# Patient Record
Sex: FEMALE | Race: AMERICAN INDIAN OR ALASKA NATIVE | NOT HISPANIC OR LATINO
[De-identification: names, ages, dates, MRNs, and addresses within clinical notes are randomized per-mention and may not be internally consistent; named-entity substitution may affect disease eponyms.]

---

## 2017-04-08 ENCOUNTER — APPOINTMENT (OUTPATIENT)
Dept: MRI IMAGING | Facility: CLINIC | Age: 77
End: 2017-04-08

## 2017-04-19 ENCOUNTER — OUTPATIENT (OUTPATIENT)
Dept: OUTPATIENT SERVICES | Facility: HOSPITAL | Age: 77
LOS: 1 days | End: 2017-04-19
Payer: MEDICARE

## 2017-04-19 ENCOUNTER — APPOINTMENT (OUTPATIENT)
Dept: MRI IMAGING | Facility: CLINIC | Age: 77
End: 2017-04-19

## 2017-04-19 DIAGNOSIS — Z00.8 ENCOUNTER FOR OTHER GENERAL EXAMINATION: ICD-10-CM

## 2017-04-19 PROCEDURE — 72148 MRI LUMBAR SPINE W/O DYE: CPT

## 2017-04-21 ENCOUNTER — APPOINTMENT (OUTPATIENT)
Dept: MRI IMAGING | Facility: CLINIC | Age: 77
End: 2017-04-21

## 2017-04-29 ENCOUNTER — APPOINTMENT (OUTPATIENT)
Dept: MRI IMAGING | Facility: CLINIC | Age: 77
End: 2017-04-29

## 2017-10-12 ENCOUNTER — OTHER (OUTPATIENT)
Age: 77
End: 2017-10-12

## 2017-10-12 ENCOUNTER — APPOINTMENT (OUTPATIENT)
Dept: CARDIOLOGY | Facility: CLINIC | Age: 77
End: 2017-10-12
Payer: MEDICARE

## 2017-10-12 ENCOUNTER — NON-APPOINTMENT (OUTPATIENT)
Age: 77
End: 2017-10-12

## 2017-10-12 VITALS — SYSTOLIC BLOOD PRESSURE: 149 MMHG | HEART RATE: 79 BPM | OXYGEN SATURATION: 96 % | DIASTOLIC BLOOD PRESSURE: 81 MMHG

## 2017-10-12 DIAGNOSIS — M79.604 PAIN IN RIGHT LEG: ICD-10-CM

## 2017-10-12 DIAGNOSIS — R06.09 OTHER FORMS OF DYSPNEA: ICD-10-CM

## 2017-10-12 DIAGNOSIS — M79.605 PAIN IN RIGHT LEG: ICD-10-CM

## 2017-10-12 PROBLEM — Z00.00 ENCOUNTER FOR PREVENTIVE HEALTH EXAMINATION: Noted: 2017-10-12

## 2017-10-12 PROCEDURE — 93000 ELECTROCARDIOGRAM COMPLETE: CPT

## 2017-10-12 PROCEDURE — 99204 OFFICE O/P NEW MOD 45 MIN: CPT

## 2017-10-12 RX ORDER — ASPIRIN ENTERIC COATED TABLETS 81 MG 81 MG/1
81 TABLET, DELAYED RELEASE ORAL
Qty: 30 | Refills: 6 | Status: ACTIVE | COMMUNITY
Start: 2017-10-12

## 2017-10-12 RX ORDER — LOSARTAN POTASSIUM 100 MG/1
100 TABLET, FILM COATED ORAL
Qty: 90 | Refills: 3 | Status: ACTIVE | COMMUNITY
Start: 2017-10-12

## 2017-10-23 ENCOUNTER — OUTPATIENT (OUTPATIENT)
Dept: OUTPATIENT SERVICES | Facility: HOSPITAL | Age: 77
LOS: 1 days | End: 2017-10-23
Payer: MEDICARE

## 2017-10-23 ENCOUNTER — APPOINTMENT (OUTPATIENT)
Dept: CV DIAGNOSTICS | Facility: HOSPITAL | Age: 77
End: 2017-10-23

## 2017-10-23 ENCOUNTER — APPOINTMENT (OUTPATIENT)
Dept: CV DIAGNOSITCS | Facility: HOSPITAL | Age: 77
End: 2017-10-23

## 2017-10-23 DIAGNOSIS — R06.09 OTHER FORMS OF DYSPNEA: ICD-10-CM

## 2017-10-23 PROCEDURE — 93306 TTE W/DOPPLER COMPLETE: CPT | Mod: 26

## 2017-10-23 PROCEDURE — 93018 CV STRESS TEST I&R ONLY: CPT

## 2017-10-23 PROCEDURE — 93306 TTE W/DOPPLER COMPLETE: CPT

## 2017-10-23 PROCEDURE — 93017 CV STRESS TEST TRACING ONLY: CPT

## 2017-10-23 PROCEDURE — 78452 HT MUSCLE IMAGE SPECT MULT: CPT | Mod: 26

## 2017-10-23 PROCEDURE — A9500: CPT

## 2017-10-23 PROCEDURE — 78452 HT MUSCLE IMAGE SPECT MULT: CPT

## 2017-10-23 PROCEDURE — 93016 CV STRESS TEST SUPVJ ONLY: CPT

## 2019-10-01 ENCOUNTER — EMERGENCY (EMERGENCY)
Facility: HOSPITAL | Age: 79
LOS: 1 days | Discharge: HOME | End: 2019-10-01
Attending: EMERGENCY MEDICINE | Admitting: EMERGENCY MEDICINE
Payer: MEDICARE

## 2019-10-01 VITALS
OXYGEN SATURATION: 99 % | DIASTOLIC BLOOD PRESSURE: 76 MMHG | HEIGHT: 62 IN | TEMPERATURE: 98 F | RESPIRATION RATE: 16 BRPM | SYSTOLIC BLOOD PRESSURE: 163 MMHG | HEART RATE: 60 BPM | WEIGHT: 184.97 LBS

## 2019-10-01 VITALS
OXYGEN SATURATION: 99 % | RESPIRATION RATE: 15 BRPM | HEART RATE: 61 BPM | DIASTOLIC BLOOD PRESSURE: 77 MMHG | TEMPERATURE: 98 F | SYSTOLIC BLOOD PRESSURE: 152 MMHG

## 2019-10-01 LAB
APTT BLD: 31.4 SEC — SIGNIFICANT CHANGE UP (ref 28.5–37)
INR BLD: 1 RATIO — SIGNIFICANT CHANGE UP (ref 0.88–1.16)
PROTHROM AB SERPL-ACNC: 10.9 SEC — SIGNIFICANT CHANGE UP (ref 10–12.9)

## 2019-10-01 PROCEDURE — 72125 CT NECK SPINE W/O DYE: CPT | Mod: 26

## 2019-10-01 PROCEDURE — 96365 THER/PROPH/DIAG IV INF INIT: CPT

## 2019-10-01 PROCEDURE — 72125 CT NECK SPINE W/O DYE: CPT

## 2019-10-01 PROCEDURE — 85730 THROMBOPLASTIN TIME PARTIAL: CPT

## 2019-10-01 PROCEDURE — 36415 COLL VENOUS BLD VENIPUNCTURE: CPT

## 2019-10-01 PROCEDURE — 85610 PROTHROMBIN TIME: CPT

## 2019-10-01 PROCEDURE — 96375 TX/PRO/DX INJ NEW DRUG ADDON: CPT

## 2019-10-01 PROCEDURE — 99284 EMERGENCY DEPT VISIT MOD MDM: CPT

## 2019-10-01 PROCEDURE — 99284 EMERGENCY DEPT VISIT MOD MDM: CPT | Mod: 25

## 2019-10-01 RX ORDER — MAGNESIUM SULFATE 500 MG/ML
2 VIAL (ML) INJECTION ONCE
Refills: 0 | Status: COMPLETED | OUTPATIENT
Start: 2019-10-01 | End: 2019-10-01

## 2019-10-01 RX ORDER — CYCLOBENZAPRINE HYDROCHLORIDE 10 MG/1
1 TABLET, FILM COATED ORAL
Qty: 30 | Refills: 0
Start: 2019-10-01

## 2019-10-01 RX ORDER — SODIUM CHLORIDE 9 MG/ML
1000 INJECTION INTRAMUSCULAR; INTRAVENOUS; SUBCUTANEOUS ONCE
Refills: 0 | Status: COMPLETED | OUTPATIENT
Start: 2019-10-01 | End: 2019-10-01

## 2019-10-01 RX ORDER — KETOROLAC TROMETHAMINE 30 MG/ML
15 SYRINGE (ML) INJECTION ONCE
Refills: 0 | Status: DISCONTINUED | OUTPATIENT
Start: 2019-10-01 | End: 2019-10-01

## 2019-10-01 RX ORDER — ASPIRIN/CALCIUM CARB/MAGNESIUM 324 MG
0 TABLET ORAL
Qty: 0 | Refills: 0 | DISCHARGE

## 2019-10-01 RX ORDER — METOCLOPRAMIDE HCL 10 MG
10 TABLET ORAL ONCE
Refills: 0 | Status: COMPLETED | OUTPATIENT
Start: 2019-10-01 | End: 2019-10-01

## 2019-10-01 RX ORDER — METHOCARBAMOL 500 MG/1
1000 TABLET, FILM COATED ORAL ONCE
Refills: 0 | Status: COMPLETED | OUTPATIENT
Start: 2019-10-01 | End: 2019-10-01

## 2019-10-01 RX ORDER — METHOCARBAMOL 500 MG/1
1000 TABLET, FILM COATED ORAL ONCE
Refills: 0 | Status: DISCONTINUED | OUTPATIENT
Start: 2019-10-01 | End: 2019-10-01

## 2019-10-01 RX ADMIN — Medication 15 MILLIGRAM(S): at 16:47

## 2019-10-01 RX ADMIN — Medication 125 MILLIGRAM(S): at 15:10

## 2019-10-01 RX ADMIN — Medication 2 GRAM(S): at 16:28

## 2019-10-01 RX ADMIN — METHOCARBAMOL 1000 MILLIGRAM(S): 500 TABLET, FILM COATED ORAL at 15:47

## 2019-10-01 RX ADMIN — Medication 10 MILLIGRAM(S): at 15:12

## 2019-10-01 RX ADMIN — Medication 150 MILLIGRAM(S): at 17:29

## 2019-10-01 RX ADMIN — SODIUM CHLORIDE 1000 MILLILITER(S): 9 INJECTION INTRAMUSCULAR; INTRAVENOUS; SUBCUTANEOUS at 15:00

## 2019-10-01 RX ADMIN — Medication 50 GRAM(S): at 15:25

## 2019-10-01 RX ADMIN — Medication 15 MILLIGRAM(S): at 17:15

## 2019-10-01 NOTE — ED PROVIDER NOTE - CARE PLAN
Principal Discharge DX:	Intractable migraine Principal Discharge DX:	Intractable migraine  Secondary Diagnosis:	Cervical spondylosis

## 2019-10-01 NOTE — ED PROVIDER NOTE - CLINICAL SUMMARY MEDICAL DECISION MAKING FREE TEXT BOX
78 yo F with intractable migraine, currently under neurological care, no alarming signs, chronic HA-->will check labs, analgesia, d/c for neuro evaluation outpt

## 2019-10-01 NOTE — ED PROVIDER NOTE - PROGRESS NOTE DETAILS
Allison ELENA for ED attending, Dr. Mitchell : 78 y/o female with a PMHx of migraines presents to the ED c/o HA x 4-5 weeks. Pt can usually manage her migraines but this episode is constant and medications give no relief. Was seen in another ER, had CT and was dx with sinusitis and put on abx and steroids, but still no relief of HA after course. Has seen neuro, had MRI/MRA wnl, neuro suggested spinal tap and to get PT/PTT to r/o SAH. Because pain was too back was told to go the ED. Also reports neck pain, and HA is frontal. Allison ELENA for ED attending, Dr. Mitchell: reassessed pt , HA is worse after some meds, which has happened in past after certain meds, no meningeal signs, no focal deficits, reviewed CT results with pt and son, discussed with pt's neurologist Dr. Alcala,  suspicion for subarachnoid is very low given 4 week time frame, and pattern of pain, did offer tap to pt and given the risk and benefits pt declined. will attempt additional meds, suspicions for cervical HA with chronic migraine, after pain is better controlled, tx and released for outpt cervical MRI. Pt reports symptomatic relief. Will d/c on Lyrica, along with Medrol dose pack and Flexeril as per son's request. Pt well appearing, NVI. No emergent concerns at this time. Pt to f/u with neurology. Stable for DC home.

## 2019-10-01 NOTE — ED PROVIDER NOTE - NSTIMEPROVIDERCAREINITIATE_GEN_ER
- Pt malnourished with poor PO intake  - SHANE tube placed 12/8/17 and TF started  - Not tolerating TF --> central line placed and TPN started 12/9  - Remove shane 12/11   01-Oct-2019 14:05

## 2019-10-01 NOTE — ED ADULT NURSE REASSESSMENT NOTE - NS ED NURSE REASSESS COMMENT FT1
Patient to CT at this time. Ambulated to bathroom. Patient smiling, requesting to eat dinner. MD request to have all test completed prior to eating.

## 2019-10-01 NOTE — ED PROVIDER NOTE - NSFOLLOWUPINSTRUCTIONS_ED_ALL_ED_FT
Take medication as directed. Follow up with neurology within the next 2-3 days. Return to ED immediately for new or worsening symptoms.

## 2019-10-01 NOTE — ED PROVIDER NOTE - PATIENT PORTAL LINK FT
You can access the FollowMyHealth Patient Portal offered by [facility] by registering at the following website: [email or web address]. By joining [employer]s FollowMyHealth portal, you will also be able to view your health information using other applications (apps) compatible with our system.

## 2019-10-01 NOTE — ED ADULT NURSE NOTE - ISOLATION TYPE:
None
Keep dressing dry, clean, intact for 2 days. Do not get incision wet for 48 hours (2 days). After 2 days, remove dressing (clear tape and gauze) and leave steri strips (white strips) on. You can shower with steri strips on. The steri strips will fall off during shower. Do not rub the steri strips off. Pat dry after shower. It sometimes take 2-3 days for them to fall off.

## 2019-10-01 NOTE — ED ADULT NURSE NOTE - CHPI ED NUR SYMPTOMS NEG
no confusion/no loss of consciousness/no nausea/no numbness/no vomiting/no weakness/no blurred vision/no dizziness/no change in level of consciousness/no fever

## 2019-10-01 NOTE — ED PROVIDER NOTE - OBJECTIVE STATEMENT
80 yo F PMHx HTN, HLD, hypothyroidism GERD, migraines presents to ED c/o intractable migraine at at least 4 weeks. Pt presents with her son. As per son, pt had initial CT scan after symptom onset which showed pansinusitis--> saw ENT, was treated with abx/steroids x 2 rounds, no symptom relief. Pt has since followed up with neurology (Dr. Dorian España/Yajaira 78 yo F PMHx HTN, HLD, hypothyroidism GERD, migraines presents to ED c/o intractable migraine at at least 4 weeks. Pt presents with her son. As per son, pt had initial CT scan after symptom onset which showed pansinusitis--> saw ENT, was treated with abx/steroids x 2 rounds, no symptom relief. Pt has since followed up with neurology (Dr. Dorian España/Yajaira Alcala). Pt has had f/u CT scan and MRI/MRA (2 days ago)-- all imaging, blood work (including ESR as per son) negative. Plan was for pt to have spinal tap in the office. Neurologist requested PT/PTT prior to tap, but pt couldn't go to the lab due to pain 80 yo F PMHx HTN, HLD, hypothyroidism GERD, migraines presents to ED c/o intractable migraine at at least 4 weeks. Reports constant pressure-like, aching pain to b/l frontal area and occiput. Pt presents with her son. As per son, pt had initial CT scan after symptom onset which showed pansinusitis--> saw ENT, was treated with abx/steroids x 2 rounds, no symptom relief. Pt has since followed up with neurology (Dr. Dorian España/Yajaira Alcala). Pt has had f/u CT scan and MRI/MRA (2 days ago)-- all imaging, blood work (including ESR as per son) negative. Throughout this time, pt has been giving various treatments for migraine with no relief. Plan was for pt to have spinal tap in the office. As per son, neurologist requested PT/PTT prior to tap, but pt couldn't go to the lab due to pain- so they came to the ED. Pt denies numbness/tingling, fever/chills

## 2019-10-01 NOTE — ED ADULT NURSE NOTE - NSIMPLEMENTINTERV_GEN_ALL_ED
Implemented All Universal Safety Interventions:  [place] to call system. Call bell, personal items and telephone within reach. Instruct patient to call for assistance. Room bathroom lighting operational. Non-slip footwear when patient is off stretcher. Physically safe environment: no spills, clutter or unnecessary equipment. Stretcher in lowest position, wheels locked, appropriate side rails in place.

## 2019-10-10 PROBLEM — I10 ESSENTIAL (PRIMARY) HYPERTENSION: Chronic | Status: ACTIVE | Noted: 2019-10-01

## 2019-10-10 PROBLEM — G43.909 MIGRAINE, UNSPECIFIED, NOT INTRACTABLE, WITHOUT STATUS MIGRAINOSUS: Chronic | Status: ACTIVE | Noted: 2019-10-01

## 2019-10-10 PROBLEM — K21.9 GASTRO-ESOPHAGEAL REFLUX DISEASE WITHOUT ESOPHAGITIS: Chronic | Status: ACTIVE | Noted: 2019-10-01

## 2019-10-10 PROBLEM — E03.9 HYPOTHYROIDISM, UNSPECIFIED: Chronic | Status: ACTIVE | Noted: 2019-10-01

## 2019-10-11 ENCOUNTER — APPOINTMENT (OUTPATIENT)
Dept: PAIN MANAGEMENT | Facility: CLINIC | Age: 79
End: 2019-10-11
Payer: MEDICARE

## 2019-10-11 VITALS
BODY MASS INDEX: 33.49 KG/M2 | SYSTOLIC BLOOD PRESSURE: 143 MMHG | WEIGHT: 182 LBS | HEART RATE: 58 BPM | DIASTOLIC BLOOD PRESSURE: 73 MMHG | HEIGHT: 62 IN

## 2019-10-11 PROCEDURE — 99204 OFFICE O/P NEW MOD 45 MIN: CPT

## 2019-10-11 RX ORDER — BACLOFEN 10 MG/1
10 TABLET ORAL EVERY 8 HOURS
Qty: 60 | Refills: 3 | Status: ACTIVE | COMMUNITY
Start: 2019-10-11 | End: 1900-01-01

## 2019-10-14 ENCOUNTER — TRANSCRIPTION ENCOUNTER (OUTPATIENT)
Age: 79
End: 2019-10-14

## 2019-10-15 NOTE — PHYSICAL EXAM
[General Appearance - Alert] : alert [General Appearance - In No Acute Distress] : in no acute distress [General Appearance - Well Nourished] : well nourished [General Appearance - Well-Appearing] : healthy appearing [Oriented To Time, Place, And Person] : oriented to person, place, and time [FreeTextEntry1] : obese [Impaired Insight] : insight and judgment were intact [Affect] : the affect was normal [Mood] : the mood was normal [Memory Remote] : remote memory was not impaired [Memory Recent] : recent memory was not impaired [Person] : oriented to person [Place] : oriented to place [Remote Intact] : remote memory intact [Time] : oriented to time [Concentration Intact] : normal concentrating ability [Registration Intact] : recent registration memory intact [Visual Intact] : visual attention was ~T not ~L decreased [Span Intact] : the attention span was normal [Naming Objects] : no difficulty naming common objects [Writing A Sentence] : no difficulty writing a sentence [Reading] : reading intact [Current Events] : adequate knowledge of current events [Comprehension] : comprehension intact [Fluency] : fluency intact [Vocabulary] : adequate range of vocabulary [Cranial Nerves Oculomotor (III)] : extraocular motion intact [Past History] : adequate knowledge of personal past history [Cranial Nerves Facial (VII)] : face symmetrical [Cranial Nerves Trigeminal (V)] : facial sensation intact symmetrically [Cranial Nerves Accessory (XI - Cranial And Spinal)] : head turning and shoulder shrug symmetric [Cranial Nerves Hypoglossal (XII)] : there was no tongue deviation with protrusion [Cranial Nerves Vestibulocochlear (VIII)] : hearing was intact bilaterally [Motor Strength] : muscle strength was normal in all four extremities [No Muscle Atrophy] : normal bulk in all four extremities [Motor Handedness Right-Handed] : the patient is right hand dominant [Motor Strength Lower Extremities Bilaterally] : strength was normal in both lower extremities [Motor Strength Upper Extremities Bilaterally] : strength was normal in both upper extremities [Allodynia] : no ~T allodynia present [Balance] : balance was intact [Sensation Tactile Decrease] : light touch was intact [Tremor] : no tremor present [Limited Balance] : balance was intact [Past-pointing] : there was no past-pointing [Dysdiadochokinesia Bilaterally] : not present [Coordination - Dysmetria Impaired Finger-to-Nose Bilateral] : not present [2+] : Brachioradialis left 2+ [Sclera] : the sclera and conjunctiva were normal [FreeTextEntry9] : negative saini's [Extraocular Movements] : extraocular movements were intact [PERRL With Normal Accommodation] : pupils were equal in size, round, reactive to light, with normal accommodation [Outer Ear] : the ears and nose were normal in appearance [Neck Cervical Mass (___cm)] : no neck mass was observed [Edema] : there was no peripheral edema [Exaggerated Use Of Accessory Muscles For Inspiration] : no accessory muscle use [Abnormal Walk] : normal gait [No Spinal Tenderness] : no spinal tenderness [Involuntary Movements] : no involuntary movements were seen [Nail Clubbing] : no clubbing  or cyanosis of the fingernails [Musculoskeletal - Swelling] : no joint swelling seen [Motor Tone] : muscle strength and tone were normal [Skin Color & Pigmentation] : normal skin color and pigmentation [] : no rash [Skin Turgor] : normal skin turgor

## 2019-10-15 NOTE — HISTORY OF PRESENT ILLNESS
[Chronic Headache] : chronic headache [Photophobia] : photophobia [Phonophobia] : phonophobia [Neck Pain] : neck pain [Scalp Tenderness] : scalp tenderness [FreeTextEntry1] : Pt is a 80 yo woman who notes a history of headache that goes back to after her 2 children were born (and likely longer).  Notes that they were intermittent originally but have varied in frequency over time. Has had at least 2 periods of exacerbation in 2013 and 2015 where her function was severely compromised. More recently had a more severe, refractory episode which lasted multiple days and was refractory to her treatment.  Historically, she will usually treat with butalbital but can use it daily ( notes 2-3 years of daily use at some point.(unclear)  Does get p/p phobia, no nausea, \par Headaches can be worst upon awakening in the morning.  Has been chronic and daily for at least 1 month.\par Has used codeine in past but was "off", Had used topiramate but had se's that she can't currently recall, had used amitriptyline but also felt too sedated with its use to increase dose.  Is on losartan now (had been on two ace inhibitors prior to this.)\par Did have steroid taper during ER visit with most recent exacerbation with moderate effect and no noted ill side effects.\par  [Vomiting] : no Vomiting [Nausea] : no nausea

## 2019-10-15 NOTE — REVIEW OF SYSTEMS
[Chills] : no chills [Fever] : no fever [Feeling Poorly] : feeling poorly [Feeling Tired] : feeling tired [Recent Weight Loss (___ Lbs)] : no recent weight loss [Recent Weight Gain (___ Lbs)] : no recent weight gain [Eye Pain] : eye pain [Eyesight Problems] : no eyesight problems [Chest Pain] : no chest pain [Loss Of Hearing] : no hearing loss [Constipation] : no constipation [Cough] : no cough [Palpitations] : no palpitations [Neck Pain] : neck pain [Arthralgias] : arthralgias [Diarrhea] : no diarrhea [Skin Lesions] : no skin lesions [Joint Stiffness] : joint stiffness [Skin Wound] : no skin wound [Sleep Disturbances] : sleep disturbances [Dizziness] : dizziness [Itching] : no itching [Muscle Weakness] : no muscle weakness [Swollen Glands] : no swollen glands [Swollen Glands In The Neck] : no swollen glands in the neck

## 2019-10-15 NOTE — ASSESSMENT
[FreeTextEntry1] : Gave information re: general headache hygiene and headache triggers\par will give trial of prn baclofen\par trial of botulinum toxin.\par information re: acute management.

## 2019-10-18 ENCOUNTER — APPOINTMENT (OUTPATIENT)
Dept: PAIN MANAGEMENT | Facility: CLINIC | Age: 79
End: 2019-10-18
Payer: MEDICARE

## 2019-10-18 VITALS
WEIGHT: 182 LBS | HEART RATE: 61 BPM | HEIGHT: 62 IN | DIASTOLIC BLOOD PRESSURE: 65 MMHG | BODY MASS INDEX: 33.49 KG/M2 | SYSTOLIC BLOOD PRESSURE: 120 MMHG

## 2019-10-18 PROCEDURE — 64615 CHEMODENERV MUSC MIGRAINE: CPT

## 2019-10-18 PROCEDURE — 99213 OFFICE O/P EST LOW 20 MIN: CPT | Mod: 25

## 2019-10-22 NOTE — REVIEW OF SYSTEMS
[Chills] : no chills [Fever] : no fever [Feeling Tired] : feeling tired [Feeling Poorly] : feeling poorly [Eye Pain] : eye pain [Eyesight Problems] : no eyesight problems [Loss Of Hearing] : no hearing loss [Chest Pain] : no chest pain [Palpitations] : no palpitations [Cough] : no cough [Arthralgias] : arthralgias [Skin Lesions] : no skin lesions [Neck Pain] : neck pain [Itching] : no itching [Skin Wound] : no skin wound [Sleep Disturbances] : sleep disturbances [Muscle Weakness] : no muscle weakness [As Noted in HPI] : as noted in HPI

## 2019-10-22 NOTE — PHYSICAL EXAM
[General Appearance - Alert] : alert [General Appearance - In No Acute Distress] : in no acute distress [General Appearance - Well Nourished] : well nourished [General Appearance - Well-Appearing] : healthy appearing [Impaired Insight] : insight and judgment were intact [Oriented To Time, Place, And Person] : oriented to person, place, and time [Mood] : the mood was normal [Memory Recent] : recent memory was not impaired [Affect] : the affect was normal [Memory Remote] : remote memory was not impaired [Person] : oriented to person [Place] : oriented to place [Time] : oriented to time [Registration Intact] : recent registration memory intact [Remote Intact] : remote memory intact [Span Intact] : the attention span was normal [Concentration Intact] : normal concentrating ability [Visual Intact] : visual attention was ~T not ~L decreased [Writing A Sentence] : no difficulty writing a sentence [Fluency] : fluency intact [Naming Objects] : no difficulty naming common objects [Comprehension] : comprehension intact [Reading] : reading intact [Current Events] : adequate knowledge of current events [Past History] : adequate knowledge of personal past history [Vocabulary] : adequate range of vocabulary [Cranial Nerves Facial (VII)] : face symmetrical [Cranial Nerves Oculomotor (III)] : extraocular motion intact [Cranial Nerves Trigeminal (V)] : facial sensation intact symmetrically [Cranial Nerves Accessory (XI - Cranial And Spinal)] : head turning and shoulder shrug symmetric [Cranial Nerves Vestibulocochlear (VIII)] : hearing was intact bilaterally [Cranial Nerves Hypoglossal (XII)] : there was no tongue deviation with protrusion [Motor Strength] : muscle strength was normal in all four extremities [No Muscle Atrophy] : normal bulk in all four extremities [Motor Handedness Right-Handed] : the patient is right hand dominant [Sensation Tactile Decrease] : light touch was intact [Balance] : balance was intact [Sclera] : the sclera and conjunctiva were normal [Outer Ear] : the ears and nose were normal in appearance [Extraocular Movements] : extraocular movements were intact [PERRL With Normal Accommodation] : pupils were equal in size, round, reactive to light, with normal accommodation [Neck Cervical Mass (___cm)] : no neck mass was observed [Exaggerated Use Of Accessory Muscles For Inspiration] : no accessory muscle use [Edema] : there was no peripheral edema [Abnormal Walk] : normal gait [No Spinal Tenderness] : no spinal tenderness [Nail Clubbing] : no clubbing  or cyanosis of the fingernails [Involuntary Movements] : no involuntary movements were seen [Musculoskeletal - Swelling] : no joint swelling seen [Motor Tone] : muscle strength and tone were normal [Skin Color & Pigmentation] : normal skin color and pigmentation [Skin Turgor] : normal skin turgor [] : no rash [FreeTextEntry1] : obese [Motor Strength Lower Extremities Bilaterally] : strength was normal in both lower extremities [Motor Strength Upper Extremities Bilaterally] : strength was normal in both upper extremities [Limited Balance] : balance was intact [Past-pointing] : there was no past-pointing [Allodynia] : no ~T allodynia present [Tremor] : no tremor present [Dysdiadochokinesia Bilaterally] : not present [Coordination - Dysmetria Impaired Finger-to-Nose Bilateral] : not present [2+] : Patella left 2+ [FreeTextEntry9] : negative saini's

## 2019-10-22 NOTE — HISTORY OF PRESENT ILLNESS
[FreeTextEntry1] : Pt and  return today for administration of Botox.  She notes concern over its use and potential pain associated.  However, she notes tat she continues to have similar pain to previous with no further relief.  Is anticipating trial today prior to trip back to [facility] for the winter.\par No change in quality of headache\par No new associated symptoms.\par No signs of infection- no fevers, chills or rash.\par Understands risks and benefits of treatment. [Phonophobia] : phonophobia [Chronic Headache] : chronic headache [Photophobia] : photophobia [Neck Pain] : neck pain [> 4 hours] : > 4 hours [Daily] : daily [stayed the same] : stayed the same [Stable] : The patient reports ~his/her~ symptoms since the last visit are stable

## 2019-10-22 NOTE — PROCEDURE
[Chronic migraine] : Chronic migraine [Consent Signed] : consent signed [Continue Current Treatment] : continue current treatment [FreeTextEntry1] : 200 units of botox in 4 cc normal saline\par .2 in procerus\par .1 in left and right \par .1 in 2 left,1 midline and 2 right frontalis\par .1 in 4 left and 4 right temporalis\par .1 in 3 left and 3 right occipitalis\par .1 in 2 left and 2 right paraspinals\par .1 in 3 left and 3 right trapezius [Adverse Effects] : no adverse effects

## 2020-03-17 ENCOUNTER — TRANSCRIPTION ENCOUNTER (OUTPATIENT)
Age: 80
End: 2020-03-17

## 2020-03-17 ENCOUNTER — APPOINTMENT (OUTPATIENT)
Dept: PAIN MANAGEMENT | Facility: CLINIC | Age: 80
End: 2020-03-17

## 2020-04-23 ENCOUNTER — APPOINTMENT (OUTPATIENT)
Dept: PAIN MANAGEMENT | Facility: CLINIC | Age: 80
End: 2020-04-23

## 2020-07-28 ENCOUNTER — TRANSCRIPTION ENCOUNTER (OUTPATIENT)
Age: 80
End: 2020-07-28

## 2020-08-10 ENCOUNTER — APPOINTMENT (OUTPATIENT)
Dept: PAIN MANAGEMENT | Facility: CLINIC | Age: 80
End: 2020-08-10
Payer: MEDICARE

## 2020-08-10 VITALS
SYSTOLIC BLOOD PRESSURE: 143 MMHG | DIASTOLIC BLOOD PRESSURE: 76 MMHG | HEART RATE: 75 BPM | HEIGHT: 62 IN | WEIGHT: 170 LBS | BODY MASS INDEX: 31.28 KG/M2

## 2020-08-10 VITALS — TEMPERATURE: 97.7 F

## 2020-08-10 DIAGNOSIS — R53.83 OTHER FATIGUE: ICD-10-CM

## 2020-08-10 PROCEDURE — 64615 CHEMODENERV MUSC MIGRAINE: CPT

## 2020-08-10 PROCEDURE — 99214 OFFICE O/P EST MOD 30 MIN: CPT | Mod: 25

## 2020-08-14 PROBLEM — R53.83 FATIGUE: Status: ACTIVE | Noted: 2017-10-12

## 2020-08-14 NOTE — ASSESSMENT
[FreeTextEntry1] : Would plan to repeat Botox today\par continue other medication and consider use of cgrp antagonist or ditan in future.\par to rtc 4 months.

## 2020-08-14 NOTE — REVIEW OF SYSTEMS
[Feeling Poorly] : feeling poorly [Fever] : no fever [Recent Weight Gain (___ Lbs)] : no recent weight gain [Feeling Tired] : feeling tired [Eye Pain] : eye pain [Nasal Discharge] : no nasal discharge [Recent Weight Loss (___ Lbs)] : no recent weight loss [Chest Pain] : no chest pain [Shortness Of Breath] : no shortness of breath [Palpitations] : no palpitations [Cough] : no cough [SOB on Exertion] : shortness of breath during exertion [Skin Lesions] : no skin lesions [Skin Wound] : no skin wound [Arthralgias] : arthralgias [Itching] : no itching [Confused] : no confusion [Convulsions] : no convulsions [Dizziness] : dizziness [Fainting] : no fainting [Swollen Glands] : no swollen glands [Muscle Weakness] : no muscle weakness [Swollen Glands In The Neck] : no swollen glands in the neck

## 2020-08-14 NOTE — HISTORY OF PRESENT ILLNESS
[FreeTextEntry1] : Pt and family member note that she did very well after last injections- but that had been in Oct of last year.  Had benefit ? 6 months with wearing off.  No noted ill side effects from treatment at that time.  No other change in the quality of events that she has had.  No new focal neurological deficits.  No COVID infection.  \par Is anticipating injections to be done today.\par Was hoping to return to [place] this fall, but family notes that it is unlikely to occur because of pandemic. [Headache] : headache [Chronic Headache] : chronic headache [Dizziness] : dizziness [Photophobia] : photophobia [Nausea] : nausea [Neck Pain] : neck pain [Scalp Tenderness] : scalp tenderness [Phonophobia] : phonophobia [> 4 hours] : > 4 hours [> 15 days per month] : > 15 days per month [Stable] : Overall, patient's activity level is stable [Worsened] : The patient reports ~his/her~ symptoms since the last visit have worsened

## 2020-08-14 NOTE — PHYSICAL EXAM
[General Appearance - Alert] : alert [General Appearance - In No Acute Distress] : in no acute distress [General Appearance - Well Nourished] : well nourished [General Appearance - Well-Appearing] : healthy appearing [Place] : oriented to place [Person] : oriented to person [Time] : oriented to time [Remote Intact] : remote memory intact [Registration Intact] : recent registration memory intact [Visual Intact] : visual attention was ~T not ~L decreased [Concentration Intact] : normal concentrating ability [Comprehension] : comprehension intact [Fluency] : fluency intact [Past History] : adequate knowledge of personal past history [Vocabulary] : adequate range of vocabulary [Current Events] : adequate knowledge of current events [Cranial Nerves Facial (VII)] : face symmetrical [Cranial Nerves Oculomotor (III)] : extraocular motion intact [Cranial Nerves Vestibulocochlear (VIII)] : hearing was intact bilaterally [Cranial Nerves Hypoglossal (XII)] : there was no tongue deviation with protrusion [Motor Tone] : muscle tone was normal in all four extremities [Cranial Nerves Accessory (XI - Cranial And Spinal)] : head turning and shoulder shrug symmetric [No Muscle Atrophy] : normal bulk in all four extremities [Motor Handedness Right-Handed] : the patient is right hand dominant [Sensation Tactile Decrease] : light touch was intact [2+] : Patella left 2+ [Neck Cervical Mass (___cm)] : no neck mass was observed [Involuntary Movements] : no involuntary movements were seen [Nail Clubbing] : no clubbing  or cyanosis of the fingernails [Skin Color & Pigmentation] : normal skin color and pigmentation [Skin Turgor] : normal skin turgor [FreeTextEntry1] : obese [Motor Strength Upper Extremities Bilaterally] : strength was normal in both upper extremities [Motor Strength Lower Extremities Bilaterally] : strength was normal in both lower extremities [Allodynia] : no ~T allodynia present [Tremor] : no tremor present [Dysdiadochokinesia Bilaterally] : not present [FreeTextEntry9] : negative saini's [Sclera] : the sclera and conjunctiva were normal [No JOHAN] : no internuclear ophthalmoplegia [Strabismus] : no strabismus was seen [Outer Ear] : the ears and nose were normal in appearance [] : no respiratory distress [Exaggerated Use Of Accessory Muscles For Inspiration] : no accessory muscle use

## 2020-08-14 NOTE — PROCEDURE
[Chronic migraine] : Chronic migraine [FreeTextEntry1] : 200 units of botox in 4 cc normal saline\par .2 in procerus\par .1 in left and right \par .1 in 2 left,1 midline and 2 right frontalis\par .1 in 4 left and 4 right temporalis\par .1 in 3 left and 3 right occipitalis\par .1 in 2 left and 2 right paraspinals\par .1 in 3 left and 3 right trapezius [Consent Signed] : consent signed [Adverse Effects] : no adverse effects [Continue Current Treatment] : continue current treatment

## 2020-09-14 ENCOUNTER — TRANSCRIPTION ENCOUNTER (OUTPATIENT)
Age: 80
End: 2020-09-14

## 2020-09-24 ENCOUNTER — TRANSCRIPTION ENCOUNTER (OUTPATIENT)
Age: 80
End: 2020-09-24

## 2020-09-24 RX ORDER — METHYLPREDNISOLONE 4 MG/1
4 TABLET ORAL
Qty: 1 | Refills: 0 | Status: ACTIVE | COMMUNITY
Start: 2020-09-24 | End: 1900-01-01

## 2020-10-01 ENCOUNTER — TRANSCRIPTION ENCOUNTER (OUTPATIENT)
Age: 80
End: 2020-10-01

## 2020-10-05 ENCOUNTER — TRANSCRIPTION ENCOUNTER (OUTPATIENT)
Age: 80
End: 2020-10-05

## 2020-10-05 RX ORDER — KETOROLAC TROMETHAMINE 10 MG/1
10 TABLET, FILM COATED ORAL
Qty: 12 | Refills: 0 | Status: ACTIVE | COMMUNITY
Start: 2020-10-05 | End: 1900-01-01

## 2020-10-08 ENCOUNTER — TRANSCRIPTION ENCOUNTER (OUTPATIENT)
Age: 80
End: 2020-10-08

## 2020-10-09 ENCOUNTER — RX RENEWAL (OUTPATIENT)
Age: 80
End: 2020-10-09

## 2020-10-14 ENCOUNTER — APPOINTMENT (OUTPATIENT)
Dept: PAIN MANAGEMENT | Facility: CLINIC | Age: 80
End: 2020-10-14
Payer: MEDICARE

## 2020-10-14 VITALS
HEIGHT: 62 IN | HEART RATE: 55 BPM | DIASTOLIC BLOOD PRESSURE: 55 MMHG | WEIGHT: 170 LBS | SYSTOLIC BLOOD PRESSURE: 162 MMHG | BODY MASS INDEX: 31.28 KG/M2

## 2020-10-14 PROCEDURE — 99213 OFFICE O/P EST LOW 20 MIN: CPT

## 2020-10-14 RX ORDER — RIMEGEPANT SULFATE 75 MG/75MG
75 TABLET, ORALLY DISINTEGRATING ORAL
Qty: 8 | Refills: 1 | Status: ACTIVE | COMMUNITY
Start: 2020-10-01 | End: 1900-01-01

## 2020-10-15 ENCOUNTER — TRANSCRIPTION ENCOUNTER (OUTPATIENT)
Age: 80
End: 2020-10-15

## 2020-10-16 ENCOUNTER — TRANSCRIPTION ENCOUNTER (OUTPATIENT)
Age: 80
End: 2020-10-16

## 2020-10-16 RX ORDER — NARATRIPTAN 2.5 MG/1
2.5 TABLET, FILM COATED ORAL
Qty: 2 | Refills: 2 | Status: ACTIVE | COMMUNITY
Start: 2020-10-15 | End: 1900-01-01

## 2020-10-20 ENCOUNTER — APPOINTMENT (OUTPATIENT)
Dept: NEUROSURGERY | Facility: CLINIC | Age: 80
End: 2020-10-20
Payer: MEDICARE

## 2020-10-20 VITALS
BODY MASS INDEX: 31.28 KG/M2 | HEIGHT: 62 IN | WEIGHT: 170 LBS | DIASTOLIC BLOOD PRESSURE: 80 MMHG | HEART RATE: 56 BPM | SYSTOLIC BLOOD PRESSURE: 139 MMHG

## 2020-10-20 VITALS — TEMPERATURE: 96.3 F

## 2020-10-20 DIAGNOSIS — R51 HEADACHE: ICD-10-CM

## 2020-10-20 PROCEDURE — 99203 OFFICE O/P NEW LOW 30 MIN: CPT

## 2020-10-20 PROCEDURE — 20553 NJX 1/MLT TRIGGER POINTS 3/>: CPT

## 2020-10-20 RX ORDER — METOPROLOL TARTRATE 50 MG/1
50 TABLET, FILM COATED ORAL
Refills: 0 | Status: ACTIVE | COMMUNITY

## 2020-10-20 RX ORDER — AMLODIPINE BESYLATE 5 MG/1
TABLET ORAL
Refills: 0 | Status: ACTIVE | COMMUNITY

## 2020-10-20 RX ORDER — CLONIDINE 0.1 MG/24H
0.1 PATCH, EXTENDED RELEASE TRANSDERMAL
Refills: 0 | Status: ACTIVE | COMMUNITY

## 2020-10-20 RX ORDER — CLONIDINE HYDROCHLORIDE 0.1 MG/1
0.1 TABLET, EXTENDED RELEASE ORAL
Refills: 0 | Status: ACTIVE | COMMUNITY

## 2020-10-20 NOTE — ASSESSMENT
[FreeTextEntry1] : 80 year old female with neck pain, possible cervicogenic headache, and myofascial pain syndrome.  Given the nature of her complaints, we did discuss trigger point injections.  She is agreeable.  Please see trigger point injection procedure note for details.  She did tolerate the procedure well without complaint or complication.  We will also obtain MRI cervical spine prior to any further interventional treatment.

## 2020-10-20 NOTE — PHYSICAL EXAM
[Mood] : the mood was normal [General Appearance - Alert] : alert [Sensation Tactile Decrease] : light touch was intact [Motor Strength] : muscle strength was normal in all four extremities [2+] : Brachioradialis left 2+ [Spurling's - Opposite Side] : Negative Spurling's on opposite side [Spurling's Same Side] : Negative Spurling's on same side [FreeTextEntry1] : pain with cervical extension, rotation, and lateral bending [] : no rash

## 2020-10-20 NOTE — HISTORY OF PRESENT ILLNESS
[Neck] : neck [Other: ___] : [unfilled] [10] : a maximum pain level of 10/10 [Looking Up] : looking up [Sharp] : sharp [Heat] : heat [PT] : PT [Medications] : medications [FreeTextEntry4] : tying a headband around her head, voltaren gel [FreeTextEntry6] : Ketorolac, voltaren gel

## 2020-10-22 NOTE — PHYSICAL EXAM
[General Appearance - Alert] : alert [General Appearance - In No Acute Distress] : in no acute distress [General Appearance - Well-Appearing] : healthy appearing [Oriented To Time, Place, And Person] : oriented to person, place, and time [Affect] : the affect was normal [Mood] : the mood was normal [Cranial Nerves Facial (VII)] : face symmetrical [Cranial Nerves Accessory (XI - Cranial And Spinal)] : head turning and shoulder shrug symmetric [Cranial Nerves Hypoglossal (XII)] : there was no tongue deviation with protrusion [Paresis Pronator Drift Right-Sided] : no pronator drift on the right [Paresis Pronator Drift Left-Sided] : no pronator drift on the left [Motor Strength Upper Extremities Bilaterally] : strength was normal in both upper extremities [Sclera] : the sclera and conjunctiva were normal [PERRL With Normal Accommodation] : pupils were equal in size, round, reactive to light, with normal accommodation [] : no respiratory distress

## 2020-10-22 NOTE — ASSESSMENT
[FreeTextEntry1] : I will send Nurtec to [facility] . \par Pt referred to Dr Tabares for trigger point injections . \par Consider Botox as well.

## 2020-10-22 NOTE — HISTORY OF PRESENT ILLNESS
[FreeTextEntry1] : Patient here today for a follow up visit . \par Ketorolac has not been helpful . Took Medrol pain September with no relief . \par Was not able to get Nurtec due to insurance . \par Pt is s/p Botox August 10 - will be due again in November. \par Migraine is constant with neck pain .Pt applied diclofenac cream to back of head and neck and does get some intermittent relief.

## 2020-10-26 ENCOUNTER — RESULT REVIEW (OUTPATIENT)
Age: 80
End: 2020-10-26

## 2020-10-26 ENCOUNTER — APPOINTMENT (OUTPATIENT)
Dept: MRI IMAGING | Facility: CLINIC | Age: 80
End: 2020-10-26
Payer: MEDICARE

## 2020-10-26 PROCEDURE — 72141 MRI NECK SPINE W/O DYE: CPT | Mod: 26

## 2020-11-03 ENCOUNTER — APPOINTMENT (OUTPATIENT)
Dept: NEUROSURGERY | Facility: CLINIC | Age: 80
End: 2020-11-03
Payer: MEDICARE

## 2020-11-03 VITALS
SYSTOLIC BLOOD PRESSURE: 157 MMHG | BODY MASS INDEX: 33.13 KG/M2 | WEIGHT: 180 LBS | HEART RATE: 73 BPM | HEIGHT: 62 IN | DIASTOLIC BLOOD PRESSURE: 84 MMHG

## 2020-11-03 DIAGNOSIS — M79.18 MYALGIA, OTHER SITE: ICD-10-CM

## 2020-11-03 PROCEDURE — 99213 OFFICE O/P EST LOW 20 MIN: CPT

## 2020-11-03 NOTE — REASON FOR VISIT
[Follow-Up: _____] : a [unfilled] follow-up visit [FreeTextEntry1] : Patient returns after completing MRI cervical spine.  She did get a massage which seems to have helped.  She is here to discuss her MRI results.

## 2020-11-03 NOTE — DATA REVIEWED
[de-identified] : \par  MR Cervical Spine No Cont             Final\par \par No Documents Attached\par \par \par \par \par   EXAM:  MR SPINE CERVICAL\par \par \par PROCEDURE DATE:  10/26/2020\par \par \par \par INTERPRETATION:  Clinical indication: Neck pain and headache. Rule out stenosis.\par \par Comparison: None.\par \par Technique:\par MRI of the cervical spine.\par Intravenous contrast: None.\par \par FINDINGS:\par \par There is no spondylolisthesis. There is mild disc height loss at C4-C5. The heights of the vertebral bodies are preserved. The cord signal is normal. The cervical medullary junction appears unremarkable.\par \par At the individual cervical levels there is no disc herniation, spinal canal stenosis or foraminal narrowing.\par \par At T1-T2 there is a minimal diffuse disc osteophyte protrusion with left lateral spurring. There is no foraminal narrowing. At T2-T3 there is a mild disc bulge with mild right foraminal narrowing.\par \par Impression: Mild degenerative changes with a mild disc bulge at T2-T3 resulting in mild right foraminal narrowing. No spinal canal stenosis or foraminal narrowing of the cervical spine.\par \par \par \par \par \par \par \par LISA LECHUGA MD; Attending Radiologist\par This document has been electronically signed. Oct 27 2020  3:46PM\par \par  \par \par  Ordered by: WELLINGTON MAN       Collected/Examined: 26Oct2020 01:46PM       \par Verified by: WELLINGTON MAN 28Oct2020 10:34AM       \par  Result Communication: Schedule appointment to discuss results;\par Stage: Final       \par  Performed at: [facility] [place]       Resulted: 27Oct2020 03:49PM       Last Updated: 28Oct2020 10:34AM       Accession: [id number]

## 2020-11-03 NOTE — ASSESSMENT
[FreeTextEntry1] : 80 year old female with cervicogenic headache and myofascial pain.  She will continue with PT and massage and follow up with me at the earliest sign that her pain worsens for further intervention as necessary.

## 2020-12-09 NOTE — ASSESSMENT
[FreeTextEntry1] : Botox today\par post procedure care discussed\par to follow up by phone in week prior to travel.\par plan to repeat upon return. Calm

## 2020-12-22 ENCOUNTER — APPOINTMENT (OUTPATIENT)
Dept: PAIN MANAGEMENT | Facility: CLINIC | Age: 80
End: 2020-12-22

## 2021-01-19 ENCOUNTER — TRANSCRIPTION ENCOUNTER (OUTPATIENT)
Age: 81
End: 2021-01-19

## 2021-04-01 ENCOUNTER — NON-APPOINTMENT (OUTPATIENT)
Age: 81
End: 2021-04-01

## 2021-04-09 ENCOUNTER — NON-APPOINTMENT (OUTPATIENT)
Age: 81
End: 2021-04-09

## 2021-04-09 ENCOUNTER — APPOINTMENT (OUTPATIENT)
Dept: CARDIOLOGY | Facility: CLINIC | Age: 81
End: 2021-04-09
Payer: MEDICARE

## 2021-04-09 VITALS
WEIGHT: 180 LBS | OXYGEN SATURATION: 97 % | HEART RATE: 64 BPM | BODY MASS INDEX: 33.13 KG/M2 | DIASTOLIC BLOOD PRESSURE: 77 MMHG | HEIGHT: 62 IN | SYSTOLIC BLOOD PRESSURE: 130 MMHG

## 2021-04-09 DIAGNOSIS — I10 ESSENTIAL (PRIMARY) HYPERTENSION: ICD-10-CM

## 2021-04-09 PROCEDURE — 99214 OFFICE O/P EST MOD 30 MIN: CPT

## 2021-04-09 PROCEDURE — 93000 ELECTROCARDIOGRAM COMPLETE: CPT

## 2021-04-09 NOTE — DISCUSSION/SUMMARY
[FreeTextEntry1] : 1.  htn - continue current meds\par \par 2.  headaches -  will get head ct.  may need to check esr for possible temporal arteritis

## 2021-04-09 NOTE — REASON FOR VISIT
[Follow-Up - Clinic] : a clinic follow-up of [Hypertension] : hypertension [FreeTextEntry1] : headaches

## 2021-04-09 NOTE — PHYSICAL EXAM
[General Appearance - Well Developed] : well developed [Normal Appearance] : normal appearance [Well Groomed] : well groomed [General Appearance - Well Nourished] : well nourished [No Deformities] : no deformities [General Appearance - In No Acute Distress] : no acute distress [Normal Conjunctiva] : the conjunctiva exhibited no abnormalities [Eyelids - No Xanthelasma] : the eyelids demonstrated no xanthelasmas [Normal Oral Mucosa] : normal oral mucosa [No Oral Pallor] : no oral pallor [No Oral Cyanosis] : no oral cyanosis [Normal Jugular Venous A Waves Present] : normal jugular venous A waves present [Normal Jugular Venous V Waves Present] : normal jugular venous V waves present [No Jugular Venous Schofield A Waves] : no jugular venous schofield A waves [Respiration, Rhythm And Depth] : normal respiratory rhythm and effort [Exaggerated Use Of Accessory Muscles For Inspiration] : no accessory muscle use [Auscultation Breath Sounds / Voice Sounds] : lungs were clear to auscultation bilaterally [FreeTextEntry1] : 2/6 crescendo decrescendo murmur [Abdomen Soft] : soft [Abdomen Tenderness] : non-tender [Abdomen Mass (___ Cm)] : no abdominal mass palpated [Abnormal Walk] : normal gait [Gait - Sufficient For Exercise Testing] : the gait was sufficient for exercise testing [Nail Clubbing] : no clubbing of the fingernails [Cyanosis, Localized] : no localized cyanosis [Petechial Hemorrhages (___cm)] : no petechial hemorrhages [Skin Color & Pigmentation] : normal skin color and pigmentation [] : no rash [No Venous Stasis] : no venous stasis [Skin Lesions] : no skin lesions [No Skin Ulcers] : no skin ulcer [No Xanthoma] : no  xanthoma was observed

## 2021-04-09 NOTE — HISTORY OF PRESENT ILLNESS
[FreeTextEntry1] : 81 year old with diabetes, hypertension and hyperlipidemia who has no signifcant cad who comes in with hypertension and headaches.  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  She has pain in the legs when she walks.   No dizziness or fainting or focal weakness

## 2021-04-19 ENCOUNTER — APPOINTMENT (OUTPATIENT)
Dept: NEUROSURGERY | Facility: CLINIC | Age: 81
End: 2021-04-19

## 2021-04-30 ENCOUNTER — APPOINTMENT (OUTPATIENT)
Dept: DISASTER EMERGENCY | Facility: CLINIC | Age: 81
End: 2021-04-30

## 2021-05-03 ENCOUNTER — APPOINTMENT (OUTPATIENT)
Dept: NEUROSURGERY | Facility: CLINIC | Age: 81
End: 2021-05-03

## 2021-05-03 LAB — SARS-COV-2 N GENE NPH QL NAA+PROBE: NOT DETECTED

## 2021-07-15 ENCOUNTER — TRANSCRIPTION ENCOUNTER (OUTPATIENT)
Age: 81
End: 2021-07-15

## 2021-08-12 ENCOUNTER — APPOINTMENT (OUTPATIENT)
Dept: PAIN MANAGEMENT | Facility: CLINIC | Age: 81
End: 2021-08-12
Payer: MEDICARE

## 2021-08-12 VITALS
HEART RATE: 81 BPM | SYSTOLIC BLOOD PRESSURE: 131 MMHG | HEIGHT: 62 IN | DIASTOLIC BLOOD PRESSURE: 73 MMHG | WEIGHT: 180 LBS | BODY MASS INDEX: 33.13 KG/M2

## 2021-08-12 PROCEDURE — 99213 OFFICE O/P EST LOW 20 MIN: CPT | Mod: 25

## 2021-08-12 PROCEDURE — 64615 CHEMODENERV MUSC MIGRAINE: CPT

## 2021-08-13 NOTE — REVIEW OF SYSTEMS
[Fever] : no fever [Recent Weight Gain (___ Lbs)] : no recent weight gain [Recent Weight Loss (___ Lbs)] : no recent weight loss [Nasal Discharge] : no nasal discharge [Chest Pain] : no chest pain [Palpitations] : no palpitations [Shortness Of Breath] : no shortness of breath [Cough] : no cough [Skin Lesions] : no skin lesions [Skin Wound] : no skin wound [Itching] : no itching [Confused] : no confusion [Convulsions] : no convulsions [Fainting] : no fainting [Muscle Weakness] : no muscle weakness [Swollen Glands] : no swollen glands [Swollen Glands In The Neck] : no swollen glands in the neck

## 2021-08-13 NOTE — PROCEDURE
[FreeTextEntry1] : 200 units of botox in 4 cc normal saline\par .1 in procerus\par .1 in left and right \par .1 in 2 left and 2 right frontalis\par .1 in 4 left and 4 right temporalis\par .1 in 3 left and 3 right occipitalis\par .1 in 2 left and 2 right paraspinals\par .1 in 3 left and 3 right trapezius [Adverse Effects] : no adverse effects

## 2021-08-13 NOTE — HISTORY OF PRESENT ILLNESS
[FreeTextEntry1] : Pt here today for Botox injections for migraine prevention. Pt has had Botox injection in the past 2x.REports she had some relief after the first set of injections but not the second .Has not had Botox injections in over 6 months. Migraine is daily .\par Pt also has left sided neck pain. \par Has had several TPI to cervical spine that provided brief, temporary relief.\par Pt had her son on cell phone - asked if sternocleidomastoid muscles could be injected with Botox.\par I discussed that I would not be able to inject the SCM muscle but would be able to complete our standard injections for Botox prevention.\par Pt ,2 sons and  agreed to standard Botox injections.\par All questions answered.\par I reviewed potential adverse effects of Botox injections.\par Pt denies illness or fever today.

## 2021-08-13 NOTE — PHYSICAL EXAM
[FreeTextEntry1] : obese [Motor Strength Upper Extremities Bilaterally] : strength was normal in both upper extremities [Motor Strength Lower Extremities Bilaterally] : strength was normal in both lower extremities [Allodynia] : no ~T allodynia present [Tremor] : no tremor present [Dysdiadochokinesia Bilaterally] : not present [FreeTextEntry9] : negative saini's

## 2021-08-13 NOTE — ASSESSMENT
[FreeTextEntry1] : RTO 12 weeks for next Botox injections\par \par \par Pt tolerated injections well today . \par \par Dr Isbell on site , billed incident to service.

## 2021-09-13 ENCOUNTER — APPOINTMENT (OUTPATIENT)
Dept: NEUROLOGY | Facility: CLINIC | Age: 81
End: 2021-09-13
Payer: MEDICARE

## 2021-09-13 VITALS
HEART RATE: 67 BPM | OXYGEN SATURATION: 99 % | SYSTOLIC BLOOD PRESSURE: 144 MMHG | HEIGHT: 62 IN | DIASTOLIC BLOOD PRESSURE: 79 MMHG | WEIGHT: 180 LBS | BODY MASS INDEX: 33.13 KG/M2

## 2021-09-13 DIAGNOSIS — M62.838 OTHER MUSCLE SPASM: ICD-10-CM

## 2021-09-13 DIAGNOSIS — R51.9 HEADACHE, UNSPECIFIED: ICD-10-CM

## 2021-09-13 DIAGNOSIS — G43.709 CHRONIC MIGRAINE W/OUT AURA, NOT INTRACTABLE, W/OUT STATUS MIGRAINOSUS: ICD-10-CM

## 2021-09-13 PROCEDURE — 64616 CHEMODENERV MUSC NECK DYSTON: CPT

## 2021-09-13 PROCEDURE — 99214 OFFICE O/P EST MOD 30 MIN: CPT | Mod: 25

## 2021-09-13 NOTE — PHYSICAL EXAM
[FreeTextEntry1] : On exam patient is awake and alert. She is pleasant and cooperative with the exam. She reports muscle pain on palpation of bilateral sternocleidomastoid and splenius capitis muscles. There is some hypertrophy of these muscles. No limitation of neck movement\par Strength is within normal limits\par No tremors

## 2021-09-13 NOTE — DISCUSSION/SUMMARY
[FreeTextEntry1] : This is an 81-year-old right-handed female who presents with chief complaints of neck pain. Requesting Botox injections. In the past been in the trapezius muscle has responded well to Botox Pain is present in the sternocleidomastoid and splenius capitis muscles bilaterally.\par \par Impression- chronic migraine, neck spasm\par \par Patient was injected 100 units of botulinum toxin . She tolerated the procedure well. Anticipated benefits and risks were discussed in detail\par Family will call me with a progress report\par All questions were addressed and answered

## 2021-09-13 NOTE — PROCEDURE
[FreeTextEntry1] : Area to be injected was prepped with alcohol wipes.\par 100 units of Botox were mixed in one mL of normal saline\par I injected as follows\par \par Right splenius capitis 30/2\par Right sternocleidomastoid 20/1\par Left splenius capitis 30/2\par Left sternocleidomastoid 20/1\par \par Total number of units injected 100\par Number of units wasted 0\par \par

## 2021-09-13 NOTE — HISTORY OF PRESENT ILLNESS
[FreeTextEntry1] : This is an 81-year-old right-handed female who presents with her son  Derek Stock for consideration of Botox injections to the neck-  chief complaints of chronic neck pain and muscle spasm\par \par Patient has history of chronic migraines. Recently her headaches have become worse, she followed with Dr. Isbell and received Botox injections. She was reporting spasm of trapezius muscles which was injected recently and was helpful. Currently she is reporting spasms in the bilateral splenius capitis and sternocleidomastoid muscles.\par \par She has received epidural injection without much benefit she's also tried medical Marijuana at low dose with slight benefit however it makes her groggy /drowsy\par

## 2022-01-14 ENCOUNTER — APPOINTMENT (OUTPATIENT)
Dept: GASTROENTEROLOGY | Facility: CLINIC | Age: 82
End: 2022-01-14
Payer: MEDICARE

## 2022-01-14 DIAGNOSIS — G89.29 EPIGASTRIC PAIN: ICD-10-CM

## 2022-01-14 DIAGNOSIS — R10.13 EPIGASTRIC PAIN: ICD-10-CM

## 2022-01-14 DIAGNOSIS — K21.9 GASTRO-ESOPHAGEAL REFLUX DISEASE W/OUT ESOPHAGITIS: ICD-10-CM

## 2022-01-14 PROCEDURE — 99443: CPT | Mod: 95

## 2022-01-14 NOTE — HISTORY OF PRESENT ILLNESS
[de-identified] : Due to COVID 19 pandemic, telephonic visit was scheduled to decrease any chance of exposure. Verbal consent was obtained from the patient.\par The patient is being evaluated for epigastric discomfort. She has a history of diabetes, hypertension, dyslipidemia. She also has a history of headaches. She has been evaluated for epigastric discomfort with a EGD and imaging. There is a growth on her liver for which she sees hepatologist. She is complaining of poor appetite now. I do not have any records yet.

## 2022-01-14 NOTE — ASSESSMENT
[FreeTextEntry1] : At this time, we will plan to review her GI work-up and also medications. Based on all the imaging, GI work-up, further suggestion of any PPI trial versus any other investigation will be suggested.\par \par \par I spent 21 minutes on the encounter\par \par \par Adam Hinojosa MD\par Gastroenterology \par \par

## 2022-03-23 ENCOUNTER — EMERGENCY (EMERGENCY)
Facility: HOSPITAL | Age: 82
LOS: 1 days | Discharge: HOME | End: 2022-03-23
Attending: STUDENT IN AN ORGANIZED HEALTH CARE EDUCATION/TRAINING PROGRAM
Payer: MEDICARE

## 2022-03-23 VITALS
OXYGEN SATURATION: 96 % | DIASTOLIC BLOOD PRESSURE: 79 MMHG | TEMPERATURE: 98 F | RESPIRATION RATE: 18 BRPM | SYSTOLIC BLOOD PRESSURE: 150 MMHG | HEART RATE: 68 BPM

## 2022-03-23 VITALS — RESPIRATION RATE: 19 BRPM | TEMPERATURE: 98 F | HEIGHT: 62 IN | OXYGEN SATURATION: 98 % | HEART RATE: 69 BPM

## 2022-03-23 LAB
ALBUMIN SERPL ELPH-MCNC: 3.8 G/DL — SIGNIFICANT CHANGE UP (ref 3.3–5)
ALP SERPL-CCNC: 79 U/L — SIGNIFICANT CHANGE UP (ref 40–120)
ALT FLD-CCNC: 21 U/L — SIGNIFICANT CHANGE UP (ref 10–45)
ANION GAP SERPL CALC-SCNC: 12 MMOL/L — SIGNIFICANT CHANGE UP (ref 5–17)
APPEARANCE UR: CLEAR — SIGNIFICANT CHANGE UP
APTT BLD: 29.2 SEC — SIGNIFICANT CHANGE UP (ref 27.5–35.5)
AST SERPL-CCNC: 23 U/L — SIGNIFICANT CHANGE UP (ref 10–40)
BACTERIA # UR AUTO: ABNORMAL
BASOPHILS # BLD AUTO: 0.02 K/UL — SIGNIFICANT CHANGE UP (ref 0–0.2)
BASOPHILS NFR BLD AUTO: 0.3 % — SIGNIFICANT CHANGE UP (ref 0–2)
BILIRUB SERPL-MCNC: 0.3 MG/DL — SIGNIFICANT CHANGE UP (ref 0.2–1.2)
BILIRUB UR-MCNC: NEGATIVE — SIGNIFICANT CHANGE UP
BUN SERPL-MCNC: 9 MG/DL — SIGNIFICANT CHANGE UP (ref 7–23)
CALCIUM SERPL-MCNC: 10.2 MG/DL — SIGNIFICANT CHANGE UP (ref 8.4–10.5)
CHLORIDE SERPL-SCNC: 89 MMOL/L — LOW (ref 96–108)
CK MB CFR SERPL CALC: <1 NG/ML — SIGNIFICANT CHANGE UP (ref 0–3.8)
CK SERPL-CCNC: 44 U/L — SIGNIFICANT CHANGE UP (ref 25–170)
CO2 SERPL-SCNC: 26 MMOL/L — SIGNIFICANT CHANGE UP (ref 22–31)
COLOR SPEC: COLORLESS — SIGNIFICANT CHANGE UP
CREAT SERPL-MCNC: 0.67 MG/DL — SIGNIFICANT CHANGE UP (ref 0.5–1.3)
DIFF PNL FLD: NEGATIVE — SIGNIFICANT CHANGE UP
EGFR: 88 ML/MIN/1.73M2 — SIGNIFICANT CHANGE UP
EOSINOPHIL # BLD AUTO: 0.2 K/UL — SIGNIFICANT CHANGE UP (ref 0–0.5)
EOSINOPHIL NFR BLD AUTO: 2.6 % — SIGNIFICANT CHANGE UP (ref 0–6)
EPI CELLS # UR: 0 /HPF — SIGNIFICANT CHANGE UP
GLUCOSE SERPL-MCNC: 264 MG/DL — HIGH (ref 70–99)
GLUCOSE UR QL: NEGATIVE — SIGNIFICANT CHANGE UP
HCT VFR BLD CALC: 33.2 % — LOW (ref 34.5–45)
HGB BLD-MCNC: 10.6 G/DL — LOW (ref 11.5–15.5)
HYALINE CASTS # UR AUTO: 0 /LPF — SIGNIFICANT CHANGE UP (ref 0–2)
IMM GRANULOCYTES NFR BLD AUTO: 0.3 % — SIGNIFICANT CHANGE UP (ref 0–1.5)
INR BLD: 0.92 RATIO — SIGNIFICANT CHANGE UP (ref 0.88–1.16)
KETONES UR-MCNC: NEGATIVE — SIGNIFICANT CHANGE UP
LEUKOCYTE ESTERASE UR-ACNC: ABNORMAL
LYMPHOCYTES # BLD AUTO: 2.57 K/UL — SIGNIFICANT CHANGE UP (ref 1–3.3)
LYMPHOCYTES # BLD AUTO: 33.6 % — SIGNIFICANT CHANGE UP (ref 13–44)
MCHC RBC-ENTMCNC: 27.4 PG — SIGNIFICANT CHANGE UP (ref 27–34)
MCHC RBC-ENTMCNC: 31.9 GM/DL — LOW (ref 32–36)
MCV RBC AUTO: 85.8 FL — SIGNIFICANT CHANGE UP (ref 80–100)
MONOCYTES # BLD AUTO: 0.73 K/UL — SIGNIFICANT CHANGE UP (ref 0–0.9)
MONOCYTES NFR BLD AUTO: 9.6 % — SIGNIFICANT CHANGE UP (ref 2–14)
NEUTROPHILS # BLD AUTO: 4.1 K/UL — SIGNIFICANT CHANGE UP (ref 1.8–7.4)
NEUTROPHILS NFR BLD AUTO: 53.6 % — SIGNIFICANT CHANGE UP (ref 43–77)
NITRITE UR-MCNC: NEGATIVE — SIGNIFICANT CHANGE UP
NRBC # BLD: 0 /100 WBCS — SIGNIFICANT CHANGE UP (ref 0–0)
PH UR: 7 — SIGNIFICANT CHANGE UP (ref 5–8)
PLATELET # BLD AUTO: 320 K/UL — SIGNIFICANT CHANGE UP (ref 150–400)
POTASSIUM SERPL-MCNC: 3.8 MMOL/L — SIGNIFICANT CHANGE UP (ref 3.5–5.3)
POTASSIUM SERPL-SCNC: 3.8 MMOL/L — SIGNIFICANT CHANGE UP (ref 3.5–5.3)
PROT SERPL-MCNC: 6.9 G/DL — SIGNIFICANT CHANGE UP (ref 6–8.3)
PROT UR-MCNC: NEGATIVE — SIGNIFICANT CHANGE UP
PROTHROM AB SERPL-ACNC: 10.7 SEC — SIGNIFICANT CHANGE UP (ref 10.5–13.4)
RBC # BLD: 3.87 M/UL — SIGNIFICANT CHANGE UP (ref 3.8–5.2)
RBC # FLD: 12.3 % — SIGNIFICANT CHANGE UP (ref 10.3–14.5)
RBC CASTS # UR COMP ASSIST: 1 /HPF — SIGNIFICANT CHANGE UP (ref 0–4)
SARS-COV-2 RNA SPEC QL NAA+PROBE: SIGNIFICANT CHANGE UP
SODIUM SERPL-SCNC: 127 MMOL/L — LOW (ref 135–145)
SP GR SPEC: 1.02 — SIGNIFICANT CHANGE UP (ref 1.01–1.02)
TROPONIN T, HIGH SENSITIVITY RESULT: 11 NG/L — SIGNIFICANT CHANGE UP (ref 0–51)
UROBILINOGEN FLD QL: NEGATIVE — SIGNIFICANT CHANGE UP
WBC # BLD: 7.64 K/UL — SIGNIFICANT CHANGE UP (ref 3.8–10.5)
WBC # FLD AUTO: 7.64 K/UL — SIGNIFICANT CHANGE UP (ref 3.8–10.5)
WBC UR QL: 5 /HPF — SIGNIFICANT CHANGE UP (ref 0–5)

## 2022-03-23 PROCEDURE — 82947 ASSAY GLUCOSE BLOOD QUANT: CPT

## 2022-03-23 PROCEDURE — 85018 HEMOGLOBIN: CPT

## 2022-03-23 PROCEDURE — 82803 BLOOD GASES ANY COMBINATION: CPT

## 2022-03-23 PROCEDURE — 81001 URINALYSIS AUTO W/SCOPE: CPT

## 2022-03-23 PROCEDURE — 87635 SARS-COV-2 COVID-19 AMP PRB: CPT

## 2022-03-23 PROCEDURE — 87077 CULTURE AEROBIC IDENTIFY: CPT

## 2022-03-23 PROCEDURE — 85025 COMPLETE CBC W/AUTO DIFF WBC: CPT

## 2022-03-23 PROCEDURE — 83605 ASSAY OF LACTIC ACID: CPT

## 2022-03-23 PROCEDURE — 82435 ASSAY OF BLOOD CHLORIDE: CPT

## 2022-03-23 PROCEDURE — 99285 EMERGENCY DEPT VISIT HI MDM: CPT | Mod: 25

## 2022-03-23 PROCEDURE — 96374 THER/PROPH/DIAG INJ IV PUSH: CPT | Mod: XU

## 2022-03-23 PROCEDURE — 70450 CT HEAD/BRAIN W/O DYE: CPT | Mod: MA

## 2022-03-23 PROCEDURE — 71045 X-RAY EXAM CHEST 1 VIEW: CPT | Mod: 26

## 2022-03-23 PROCEDURE — 85014 HEMATOCRIT: CPT

## 2022-03-23 PROCEDURE — 99285 EMERGENCY DEPT VISIT HI MDM: CPT | Mod: FS,25

## 2022-03-23 PROCEDURE — 84132 ASSAY OF SERUM POTASSIUM: CPT

## 2022-03-23 PROCEDURE — 71045 X-RAY EXAM CHEST 1 VIEW: CPT

## 2022-03-23 PROCEDURE — 82553 CREATINE MB FRACTION: CPT

## 2022-03-23 PROCEDURE — 93005 ELECTROCARDIOGRAM TRACING: CPT

## 2022-03-23 PROCEDURE — 70496 CT ANGIOGRAPHY HEAD: CPT | Mod: 26,MA

## 2022-03-23 PROCEDURE — 84484 ASSAY OF TROPONIN QUANT: CPT

## 2022-03-23 PROCEDURE — 84295 ASSAY OF SERUM SODIUM: CPT

## 2022-03-23 PROCEDURE — 80053 COMPREHEN METABOLIC PANEL: CPT

## 2022-03-23 PROCEDURE — 85610 PROTHROMBIN TIME: CPT

## 2022-03-23 PROCEDURE — 36415 COLL VENOUS BLD VENIPUNCTURE: CPT

## 2022-03-23 PROCEDURE — 82962 GLUCOSE BLOOD TEST: CPT

## 2022-03-23 PROCEDURE — 87086 URINE CULTURE/COLONY COUNT: CPT

## 2022-03-23 PROCEDURE — 82550 ASSAY OF CK (CPK): CPT

## 2022-03-23 PROCEDURE — 87186 SC STD MICRODIL/AGAR DIL: CPT

## 2022-03-23 PROCEDURE — 93010 ELECTROCARDIOGRAM REPORT: CPT

## 2022-03-23 PROCEDURE — 70496 CT ANGIOGRAPHY HEAD: CPT | Mod: MA

## 2022-03-23 PROCEDURE — 85730 THROMBOPLASTIN TIME PARTIAL: CPT

## 2022-03-23 PROCEDURE — 70498 CT ANGIOGRAPHY NECK: CPT | Mod: MA

## 2022-03-23 PROCEDURE — 82565 ASSAY OF CREATININE: CPT

## 2022-03-23 PROCEDURE — 70498 CT ANGIOGRAPHY NECK: CPT | Mod: 26,MA

## 2022-03-23 PROCEDURE — 82330 ASSAY OF CALCIUM: CPT

## 2022-03-23 RX ORDER — CEFPODOXIME PROXETIL 100 MG
1 TABLET ORAL
Qty: 8 | Refills: 0
Start: 2022-03-23 | End: 2022-03-26

## 2022-03-23 RX ORDER — ROSUVASTATIN CALCIUM 5 MG/1
1 TABLET ORAL
Qty: 30 | Refills: 0
Start: 2022-03-23 | End: 2022-04-21

## 2022-03-23 RX ORDER — CEFTRIAXONE 500 MG/1
1000 INJECTION, POWDER, FOR SOLUTION INTRAMUSCULAR; INTRAVENOUS ONCE
Refills: 0 | Status: COMPLETED | OUTPATIENT
Start: 2022-03-23 | End: 2022-03-23

## 2022-03-23 RX ORDER — SODIUM CHLORIDE 9 MG/ML
1000 INJECTION INTRAMUSCULAR; INTRAVENOUS; SUBCUTANEOUS ONCE
Refills: 0 | Status: COMPLETED | OUTPATIENT
Start: 2022-03-23 | End: 2022-03-23

## 2022-03-23 RX ADMIN — SODIUM CHLORIDE 1000 MILLILITER(S): 9 INJECTION INTRAMUSCULAR; INTRAVENOUS; SUBCUTANEOUS at 20:05

## 2022-03-23 RX ADMIN — CEFTRIAXONE 100 MILLIGRAM(S): 500 INJECTION, POWDER, FOR SOLUTION INTRAMUSCULAR; INTRAVENOUS at 23:07

## 2022-03-23 NOTE — ED PROVIDER NOTE - PROGRESS NOTE DETAILS
Wilder, PGY3 - received pt as sign-out, code stroke with slurred speech resolved PTA. CTs WNL, as per neuro, pt can f/u outpatient for further stroke w/u; recommending continuining ASA/Dipyridamole & increasing Rosuvastatin to 20mg qd. Pt reevaluated, as per son, pt currently at her baseline. Son informed of Na 127, knows to f/u with pt's PCP. Pending UA result. Attending Ruthyo: UA w/ bacteria and leuks, will treat. Plan for DC after abx. Wilder, PGY3 – Results were discussed with patient's son as well as return precautions and follow up plan with PCP and/or specialist. Time was taken to answer any questions that the patient's son had before providing them with discharge paperwork.

## 2022-03-23 NOTE — CONSULT NOTE ADULT - SUBJECTIVE AND OBJECTIVE BOX
HPI:    (Stroke only)  NIHSS: 2 (incorrect age and month)  MRS: 1    REVIEW OF SYSTEMS    A 10-system ROS was performed and is negative except for those items noted above and/or in the HPI.    PAST MEDICAL & SURGICAL HISTORY:  Hypertension    GERD (gastroesophageal reflux disease)    Migraines    Hypothyroidism      FAMILY HISTORY:    SOCIAL HISTORY: as noted in HPI    MEDICATIONS (HOME):  Home Medications:  AcipHex 20 mg oral delayed release tablet: 1 tab(s) orally once a day (01 Oct 2019 14:38)  aspirin-dipyridamole 25 mg-200 mg oral capsule, extended release: 1 cap(s) orally 2 times a day (01 Oct 2019 14:38)  calcium (as calcium citrate) 500 mg oral tablet, effervescent:  (01 Oct 2019 14:38)  Centrum oral tablet: 1 tab(s) orally once a day (01 Oct 2019 14:38)  chlorthalione:  (01 Oct 2019 14:38)  Crestor 10 mg oral tablet: 1 tab(s) orally once a day (01 Oct 2019 14:38)  Dexilant 60 mg oral delayed release capsule: 1 cap(s) orally once a day (01 Oct 2019 14:38)  donepezil 10 mg oral tablet: 1 tab(s) orally once a day (at bedtime) (01 Oct 2019 14:38)  hyoscyamine 0.125 mg oral tablet:  (01 Oct 2019 14:38)  levothyroxine 88 mcg (0.088 mg) oral capsule: 1 cap(s) orally once a day (01 Oct 2019 14:38)  losartan-hydrochlorothiazide 100mg-12.5mg oral tablet: 1 tab(s) orally once a day (01 Oct 2019 14:38)  metFORMIN 500 mg oral tablet: 1 tab(s) orally 2 times a day (01 Oct 2019 14:38)  metoprolol succinate 50 mg oral tablet, extended release: 1 tab(s) orally once a day (01 Oct 2019 14:38)  omeprazole 20 mg oral delayed release capsule: 1 cap(s) orally once a day (01 Oct 2019 14:38)  Super B Complex oral tablet: 1 tab(s) orally once a day (01 Oct 2019 14:38)  vitamin d 5000:  (01 Oct 2019 14:38)    MEDICATIONS  (STANDING):    MEDICATIONS  (PRN):    ALLERGIES/INTOLERANCES:  Allergies  No Known Allergies    Intolerances    VITALS & EXAMINATION:  Vital Signs Last 24 Hrs  T(C): 36.5 (23 Mar 2022 19:30), Max: 36.8 (23 Mar 2022 18:52)  T(F): 97.7 (23 Mar 2022 19:30), Max: 98.2 (23 Mar 2022 18:52)  HR: 78 (23 Mar 2022 19:30) (69 - 78)  BP: 173/77 (23 Mar 2022 19:30) (173/77 - 173/77)  BP(mean): --  RR: 17 (23 Mar 2022 19:30) (17 - 19)  SpO2: 99% (23 Mar 2022 19:30) (98% - 99%)    General:  Constitutional: Obese Female, appears stated age, in no apparent distress including pain  Head: Normocephalic & atraumatic.    Neurological (>12):  MS: Eyes open. Responds to verbal stimuli. Awake, alert, oriented to person, place only. Normal affect. Follows all commands.    Language: Speech is clear, fluent with good repetition & comprehension (able to name objects pen)    CNs: VFF. EOMI no nystagmus, no diplopia. V1-3 intact to LT/pinprick, well developed masseter muscles b/l. No facial asymmetry b/l, full eye closure strength b/l. Hearing grossly normal (rubbing fingers) b/l. Tongue midline, normal movements, no atrophy.    Motor: Normal muscle bulk & tone. No noticeable tremor. No pronator drift. All extremities are lifted up above the bed without drift    Sensation: Intact to LT b/l throughout.     Cortical: Extinction on DSS (neglect): none    Reflexes: Mute throughout b/l biceps, BR, patellar, achilles.     Coordination: No dysmetria to FTN    Gait: Slow, wide based gait walking from wheelchair to the scale and back.     LABORATORY:  CBC                       10.6   7.64  )-----------( 320      ( 23 Mar 2022 19:09 )             33.2     Chem 03-23    127<L>  |  89<L>  |  9   ----------------------------<  264<H>  3.8   |  26  |  0.67    Ca    10.2      23 Mar 2022 19:09    TPro  6.9  /  Alb  3.8  /  TBili  0.3  /  DBili  x   /  AST  23  /  ALT  21  /  AlkPhos  79  03-23    LFTs LIVER FUNCTIONS - ( 23 Mar 2022 19:09 )  Alb: 3.8 g/dL / Pro: 6.9 g/dL / ALK PHOS: 79 U/L / ALT: 21 U/L / AST: 23 U/L / GGT: x           Coagulopathy PT/INR - ( 23 Mar 2022 19:09 )   PT: 10.7 sec;   INR: 0.92 ratio         PTT - ( 23 Mar 2022 19:09 )  PTT:29.2 sec  Lipid Panel   A1c   Cardiac enzymes CARDIAC MARKERS ( 23 Mar 2022 19:09 )  x     / x     / 44 U/L / x     / <1.0 ng/mL      U/A   CSF  Immunological  Other    STUDIES & IMAGING:  Studies (EKG, EEG, EMG, etc):     Radiology (XR, CT, MR, U/S, TTE/GHANSHYAM):   HPI:  82 y.o. RH F with PMH of dementia, occipital headache (1 year), TIA (>1 year ago), HTN, HLD presented to the ED as a code stroke for slurred speech. LKN 21:30 hr on 3/22. For at least past two days, there has been on/off slurred speech. Usually present in the AM when she wakes up then disappears by the end of the day. Pt woke up unclear what time, her  noted that she has slurred speech, and son was called. Son went to her house ~17:00 hr and noticed severe garbled speech so brought her to ED. She lives at home with , has nursing aid to aid with washing but otherwise able to walk with walker and eat independently. For last TIA, saw a neurologist at [facility], and had MRI brain wo latest 8/2021, which showed only chronic white matter changes. No smoking, EtOH use, or illicit drug use    (Stroke only)  NIHSS: 2 (incorrect age and month)  MRS: 2    REVIEW OF SYSTEMS    A 10-system ROS was performed and is negative except for those items noted above and/or in the HPI.    PAST MEDICAL & SURGICAL HISTORY:  Hypertension    GERD (gastroesophageal reflux disease)    Migraines    Hypothyroidism      FAMILY HISTORY:    SOCIAL HISTORY: as noted in HPI    MEDICATIONS (HOME):  Home Medications:  AcipHex 20 mg oral delayed release tablet: 1 tab(s) orally once a day (01 Oct 2019 14:38)  aspirin-dipyridamole 25 mg-200 mg oral capsule, extended release: 1 cap(s) orally 2 times a day (01 Oct 2019 14:38)  calcium (as calcium citrate) 500 mg oral tablet, effervescent:  (01 Oct 2019 14:38)  Centrum oral tablet: 1 tab(s) orally once a day (01 Oct 2019 14:38)  chlorthalione:  (01 Oct 2019 14:38)  Crestor 10 mg oral tablet: 1 tab(s) orally once a day (01 Oct 2019 14:38)  Dexilant 60 mg oral delayed release capsule: 1 cap(s) orally once a day (01 Oct 2019 14:38)  donepezil 10 mg oral tablet: 1 tab(s) orally once a day (at bedtime) (01 Oct 2019 14:38)  hyoscyamine 0.125 mg oral tablet:  (01 Oct 2019 14:38)  levothyroxine 88 mcg (0.088 mg) oral capsule: 1 cap(s) orally once a day (01 Oct 2019 14:38)  losartan-hydrochlorothiazide 100mg-12.5mg oral tablet: 1 tab(s) orally once a day (01 Oct 2019 14:38)  metFORMIN 500 mg oral tablet: 1 tab(s) orally 2 times a day (01 Oct 2019 14:38)  metoprolol succinate 50 mg oral tablet, extended release: 1 tab(s) orally once a day (01 Oct 2019 14:38)  omeprazole 20 mg oral delayed release capsule: 1 cap(s) orally once a day (01 Oct 2019 14:38)  Super B Complex oral tablet: 1 tab(s) orally once a day (01 Oct 2019 14:38)  vitamin d 5000:  (01 Oct 2019 14:38)    MEDICATIONS  (STANDING):    MEDICATIONS  (PRN):    ALLERGIES/INTOLERANCES:  Allergies  No Known Allergies    Intolerances    VITALS & EXAMINATION:  Vital Signs Last 24 Hrs  T(C): 36.5 (23 Mar 2022 19:30), Max: 36.8 (23 Mar 2022 18:52)  T(F): 97.7 (23 Mar 2022 19:30), Max: 98.2 (23 Mar 2022 18:52)  HR: 78 (23 Mar 2022 19:30) (69 - 78)  BP: 173/77 (23 Mar 2022 19:30) (173/77 - 173/77)  BP(mean): --  RR: 17 (23 Mar 2022 19:30) (17 - 19)  SpO2: 99% (23 Mar 2022 19:30) (98% - 99%)    General:  Constitutional: Obese Female, appears stated age, in no apparent distress including pain  Head: Normocephalic & atraumatic.    Neurological (>12):  MS: Eyes open. Responds to verbal stimuli. Awake, alert, oriented to person, place only. Normal affect. Follows all commands.    Language: Speech is clear, fluent with good repetition & comprehension (able to name objects pen)    CNs: VFF. EOMI no nystagmus, no diplopia. V1-3 intact to LT/pinprick, well developed masseter muscles b/l. No facial asymmetry b/l, full eye closure strength b/l. Hearing grossly normal (rubbing fingers) b/l. Tongue midline, normal movements, no atrophy.    Motor: Normal muscle bulk & tone. No noticeable tremor. No pronator drift. All extremities are lifted up above the bed without drift    Sensation: Intact to LT b/l throughout.     Cortical: Extinction on DSS (neglect): none    Reflexes: Mute throughout b/l biceps, BR, patellar, achilles.     Coordination: No dysmetria to FTN    Gait: Slow, wide based gait walking from wheelchair to the scale and back.     LABORATORY:  CBC                       10.6   7.64  )-----------( 320      ( 23 Mar 2022 19:09 )             33.2     Chem 03-23    127<L>  |  89<L>  |  9   ----------------------------<  264<H>  3.8   |  26  |  0.67    Ca    10.2      23 Mar 2022 19:09    TPro  6.9  /  Alb  3.8  /  TBili  0.3  /  DBili  x   /  AST  23  /  ALT  21  /  AlkPhos  79  03-23    LFTs LIVER FUNCTIONS - ( 23 Mar 2022 19:09 )  Alb: 3.8 g/dL / Pro: 6.9 g/dL / ALK PHOS: 79 U/L / ALT: 21 U/L / AST: 23 U/L / GGT: x           Coagulopathy PT/INR - ( 23 Mar 2022 19:09 )   PT: 10.7 sec;   INR: 0.92 ratio         PTT - ( 23 Mar 2022 19:09 )  PTT:29.2 sec  Lipid Panel   A1c   Cardiac enzymes CARDIAC MARKERS ( 23 Mar 2022 19:09 )  x     / x     / 44 U/L / x     / <1.0 ng/mL      U/A   CSF  Immunological  Other    STUDIES & IMAGING:  Studies (EKG, EEG, EMG, etc):     Radiology (XR, CT, MR, U/S, TTE/GHANSHYAM):

## 2022-03-23 NOTE — ED ADULT NURSE NOTE - NS TRANSFER PATIENT BELONGINGS
Pt reports GI told her it was small and not to worry about it. She's wanting to know if f/u is necessary at this time? Please advise. Thanks. Clothing

## 2022-03-23 NOTE — ED PROVIDER NOTE - NSICDXPASTMEDICALHX_GEN_ALL_CORE_FT
PAST MEDICAL HISTORY:  GERD (gastroesophageal reflux disease)     Hypertension     Hypothyroidism     Migraines

## 2022-03-23 NOTE — CONSULT NOTE ADULT - ASSESSMENT
Recommendations:  [] CDU  [] MRI brain wo contrast  [] C/w home ASA 81 mg and start Plavix 75 mg qd and continue for 3 weeks per [facility] protocol  [] HA management with tylenol, toradol, reglan prn  [] Neurocheck and vital per unit protocol    Case discussed with stroke attending Dr. Libman  Case to be seen with stroke attending in AM   Impression: Resolved dysarthria of unclear etiology.     Recommendations:  [] CDU  [] MRI brain wo contrast  [] C/w home ASA 81 mg and start Plavix 75 mg qd and continue for 3 weeks per [facility] protocol  [] Can increase home rosuvastatin to 20 mg qhs (takes 10 mg qhs) or start atorvastatin 80 mg qhs  [] HA management with tylenol, toradol, reglan prn  [] Neurocheck and vital per unit protocol    Case discussed with stroke attending Dr. Libman  Case to be seen with stroke attending in AM 82 y.o. RH F with PMH of dementia, occipital headache (1 year), TIA (>1 year ago), HTN, HLD presented to the ED as a code stroke for slurred speech. LKN 21:30 hr on 3/22. NIHSS 2 (not answering age and month correctly) and mRS 2. Neuro exam non-focal, resolved dysarthria. CTH neg for acute hemorrhage or large territorial infarct. Awaiting final read for CTH and CTA.    Not a tPA candidate due to outside the window  Not a thrombectomy candidate due to no LVO seen on CTA    Impression: Resolved dysarthria of unclear etiology. Localization could be muscles of speech production vs cranial nerve XII vs brainstem vs diffuse brain dysfunction. Ddx include TIA vs r/o infectious vs r/o toxic metabolic vs acute ischemic stroke vs progression of underlying dementia vs migraines. If stroke, mechanism unknown at this time    Recommendations:  [] CDU  [] MRI brain wo contrast  [] TTE  [] +/- ILR depending on TTE results  [] C/w home ASA 81 mg and start Plavix 75 mg qd and continue for 3 weeks per [facility] protocol  [] Can increase home rosuvastatin to 20 mg qhs (takes 10 mg qhs) or start atorvastatin 80 mg qhs  [] HA management with tylenol, toradol, reglan prn  [] Neurocheck and vital per unit protocol    Case discussed with stroke attending Dr. Libman  Case to be seen with stroke attending in AM 82 y.o. RH F with PMH of dementia, occipital headache (1 year), TIA (>1 year ago), HTN, HLD presented to the ED as a code stroke for slurred speech. LKN 21:30 hr on 3/22. NIHSS 2 (not answering age and month correctly) and mRS 2. Neuro exam non-focal, resolved dysarthria. CTH neg for acute hemorrhage or large territorial infarct. CTA H/N neg.    Not a tPA candidate due to outside the window  Not a thrombectomy candidate due to no LVO seen on CTA    Impression: Resolved dysarthria of unclear etiology. Localization could be muscles of speech production vs cranial nerve XII vs brainstem vs diffuse brain dysfunction. Ddx include TIA vs r/o infectious vs r/o toxic metabolic vs acute ischemic stroke vs progression of underlying dementia vs migraines. If stroke, mechanism unknown at this time    Recommendations:  [] MRI brain wo contrast as outpt  [] TTE as outpt  [] +/- ILR depending on TTE results  [] Takes ASA 81 mg and dipyridamole combination. C/w take the combination  [] Can increase home rosuvastatin to 20 mg qhs (takes 10 mg qhs) or start atorvastatin 80 mg qhs  [] HA management with tylenol, toradol, reglan prn  [] Neurocheck and vital per unit protocol  [] Patient should follow up with Stroke NP, Carlene Maria, in clinic at [address] [place] [address]. Will email [email or web address] w/ basic PHI.     Case discussed with stroke attending Dr. Libman 82 y.o. RH F with PMH of dementia, occipital headache (1 year), TIA (>1 year ago), HTN, HLD presented to the ED as a code stroke for slurred speech. LKN 21:30 hr on 3/22. NIHSS 2 (not answering age and month correctly) and mRS 2. Neuro exam non-focal, resolved dysarthria. CTH neg for acute hemorrhage or large territorial infarct. CTA H/N neg.    Not a tPA candidate due to outside the window  Not a thrombectomy candidate due to no LVO seen on CTA    Impression: Resolved dysarthria of unclear etiology. Localization could be muscles of speech production vs cranial nerve XII vs brainstem vs diffuse brain dysfunction. Ddx include TIA vs r/o infectious vs r/o toxic metabolic vs acute ischemic stroke vs progression of underlying dementia vs migraines. If stroke, mechanism unknown at this time    Recommendations:  [] MRI brain wo contrast as outpt  [] TTE as outpt  [] +/- ILR depending on TTE results  [] C/w home ASA 81 mg   [] Informed takes dipyridamole. C/w take the combination with ASA, per outpt (possibly doing stroke prevention per ESPS-2 trial)  [] Can increase home rosuvastatin to 20 mg qhs (takes 10 mg qhs) or start atorvastatin 80 mg qhs  [] HA management with tylenol, toradol, reglan prn  [] Neurocheck and vital per unit protocol  [] Patient should follow up with Stroke NP, Carlene Maria, in clinic at [address] [place] [address]. Emailed [email or web address] w/ basic PHI.     Case discussed with stroke attending Dr. Libman

## 2022-03-23 NOTE — ED ADULT NURSE NOTE - NS ED NURSE RECORD ANOTHER VITAL SIGN
Please know that our office will communicate with you as soon as we receive completed results.  Some of our specialty labs take longer than 2 weeks to receive, thus, you may receive a survey prior to your results being available.  If you should receive a survey while awaiting your results or feel that it has been longer than anticipated, please feel free to call our office to discuss.     
Yes

## 2022-03-23 NOTE — ED PROVIDER NOTE - NS ED ATTENDING STATEMENT MOD
This was a shared visit with the MARICARMEN. I reviewed and verified the documentation and independently performed the documented:

## 2022-03-23 NOTE — ED ADULT NURSE NOTE - OBJECTIVE STATEMENT
Patient is a 82 yr old female with a PMH of dementia/strokes/HTN/HLD who presents to the ED from home complaining of slurred speech. Per patient's son, patient began possible slurring her words on/off starting 2 days ago, but the family really noticed it yesterday around 2130. Code stroke activated at 1855, brought right to CT, . Patient currently not slurring words, NIH 0, no TPA to be given, stroke team at bedside. Upon assessment, patient is AOx2, strong x4, sensation intact, left pupil 3 non reactive, right pupil 3 reactive, afebrile, breathing spontaneously on RA, abdomen soft non tender, NSR on CM, EKG done given to MD, son at bedside, skin intact, heplock patent, dysphagia passed, see neuro flowsheet, call bell at bedside, son at bedside, all safety precautions maintained and dispo possible CDU

## 2022-03-23 NOTE — ED PROVIDER NOTE - ATTENDING CONTRIBUTION TO CARE
Attending Cathie: I performed a history and physical exam of the patient and discussed their management with the MARICARMEN. I have reviewed the MARICARMEN note and agree with the documented findings and plan of care, except as noted. This was a shared visit with an MARICARMEN. I reviewed and verified the documentation and independently performed my own history/exam/medical decision making. My medical decision making and observations are found above. Please refer to any progress notes for updates on clinical course.

## 2022-03-23 NOTE — ED PROVIDER NOTE - NSFOLLOWUPINSTRUCTIONS_ED_ALL_ED_FT
Follow up with Stroke NP, Carlene Maria, in clinic at [address] [place] [address]. Emailed [email or web address] w/ basic PHI.    Ms. Stock's sodium level was low to 127. Please follow up with her primary care physician within 3 days. Bring copies of her results.      Transient Ischemic Attack    WHAT YOU NEED TO KNOW:    A transient ischemic attack (TIA), or mini-stroke, happens when blood cannot flow to part of the brain. A TIA only lasts minutes to hours and does not cause lasting damage. It is still important to get immediate medical care. A TIA may be a warning that you are about to have an ischemic stroke. An ischemic stroke happens when blood flow to the brain is suddenly blocked, usually by a blood clot.    Ischemic Stroke         DISCHARGE INSTRUCTIONS:    Call your local emergency number (911 in the ) or have someone else call if:   •You have any of the following signs of a stroke: ?Numbness or drooping on one side of your face       ?Weakness in an arm or leg      ?Confusion or difficulty speaking      ?Dizziness, a severe headache, or vision loss    BE FAST SIGNS OF A STROKE         •You have a seizure.      •You have chest pain or shortness of breath.      •You cough up blood.      Return to the emergency department if:   •Your arm or leg feels warm, tender, and painful. It may look swollen and red.      •You have unusual or heavy bleeding.      •You have a severe headache or feel dizzy.      Call your doctor or neurologist if:   •Your blood pressure or blood sugar level is higher or lower than you were told it should be.      •You have questions or concerns about your condition or care.      Warning signs of a stroke: The words BE FAST can help you remember and recognize warning signs of a stroke:  •B = Balance: Sudden loss of balance      •E = Eyes: Loss of vision in one or both eyes      •F = Face: Face droops on one side      •A = Arms: Arm drops when both arms are raised      •S = Speech: Speech is slurred or sounds different      •T = Time: Time to get help immediately    BE FAST SIGNS OF A STROKE         Medicines: You may need any of the following:   •Antiplatelets, such as aspirin, help prevent blood clots. Take your antiplatelet medicine exactly as directed. These medicines make it more likely for you to bleed or bruise. If you are told to take aspirin, do not take acetaminophen or ibuprofen instead.       •Blood thinners help prevent blood clots. Clots can cause strokes, heart attacks, and death. The following are general safety guidelines to follow while you are taking a blood thinner:?Watch for bleeding and bruising while you take blood thinners. Watch for bleeding from your gums or nose. Watch for blood in your urine and bowel movements. Use a soft washcloth on your skin, and a soft toothbrush to brush your teeth. This can keep your skin and gums from bleeding. If you shave, use an electric shaver. Do not play contact sports.       ?Tell your dentist and other healthcare providers that you take a blood thinner. Wear a bracelet or necklace that says you take this medicine.       ?Do not start or stop any other medicines unless your healthcare provider tells you to. Many medicines cannot be used with blood thinners.      ?Take your blood thinner exactly as prescribed by your healthcare provider. Do not skip does or take less than prescribed. Tell your provider right away if you forget to take your blood thinner, or if you take too much.      ?Warfarin is a blood thinner that you may need to take. The following are things you should be aware of if you take warfarin: ?Foods and medicines can affect the amount of warfarin in your blood. Do not make major changes to your diet while you take warfarin. Warfarin works best when you eat about the same amount of vitamin K every day. Vitamin K is found in green leafy vegetables and certain other foods. Ask for more information about what to eat when you are taking warfarin.      ?You will need to see your healthcare provider for follow-up visits when you are on warfarin. You will need regular blood tests. These tests are used to decide how much medicine you need.         •Other medicines may be needed to treat diabetes, depression, high cholesterol, or blood pressure problems. You may also need medicine to decrease the pressure in your brain, reduce pain, or prevent seizures.      •Take your medicine as directed. Contact your healthcare provider if you think your medicine is not helping or if you have side effects. Tell him of her if you are allergic to any medicine. Keep a list of the medicines, vitamins, and herbs you take. Include the amounts, and when and why you take them. Bring the list or the pill bottles to follow-up visits. Carry your medicine list with you in case of an emergency.      What you can do to prevent another TIA or a stroke:   •Manage health conditions. A condition such as diabetes can increase your risk for a stroke. Control your blood sugar level if you have hyperglycemia or diabetes. Take your prescribed medicines and check your blood sugar level as directed.  How to check your blood sugar           •Check your blood pressure as directed. High blood pressure can increase your risk for a stroke. If you have high blood pressure, follow your healthcare provider’s directions for controlling your blood pressure.   How to take a Blood Pressure           •Do not use nicotine products or illegal drugs. Nicotine and other chemicals in cigarettes and cigars can cause blood vessel damage. Nicotine and illegal drugs both increase your risk for a stroke. Ask your healthcare provider for information if you currently smoke or use drugs and need help to quit. E- cigarettes or smokeless tobacco still contain nicotine. Talk to your healthcare provider before you use these products.      •Talk to your healthcare provider about alcohol. Alcohol can raise your blood pressure. The recommended limit is 2 drinks in a day for men and 1 drink in a day for women. Do not binge drink or save a week's worth of alcohol to drink in 1 or 2 days. Limit weekly amounts as directed by your provider.      •Eat a variety of healthy foods. Healthy foods include whole-grain breads, low-fat dairy products, beans, lean meats, and fish. Eat at least 5 servings of fruits and vegetables each day. Choose foods that are low in fat, cholesterol, salt, and sugar. Eat foods that are high in potassium, such as potatoes and bananas. A dietitian can help you create healthy meal plans.   Healthy Foods           •Maintain a healthy weight. Ask your healthcare provider how much you should weigh. Ask him or her to help you create a weight loss plan if you are overweight. He or she can help you create small goals if you have a lot of weight to lose.      •Exercise as directed. Exercise can lower your blood pressure, cholesterol, weight, and blood sugar levels. Healthcare providers will help you create exercise goals. They can also help you make a plan to reach your goals. For example, you can break exercise into 10 minute periods, 3 times in the day. Find an exercise that you enjoy. This will make it easier for you to reach your exercise goals.  Walking for Exercise           •Manage stress. Stress can raise your blood pressure. Find ways to relax, such as deep breathing or listening to music.      Follow up with your doctor or neurologist in 1 to 2 days: Write down your questions so you remember to ask them during your visits. Follow up with Stroke NP, Carlene Maria, in clinic at [address] [place] [address]. Call to make an appointment.    Ms. Stock's sodium level was low to 127. Please follow up with her primary care physician within 3 days. Bring copies of her results.    Prescriptions were sent to your pharmacy. Give as directed on packaging. Read medication warnings.   1. Rosuvastatin: increased to 20mg daily  2. Cefpodoxime (antibiotic)    Transient Ischemic Attack    WHAT YOU NEED TO KNOW:    A transient ischemic attack (TIA), or mini-stroke, happens when blood cannot flow to part of the brain. A TIA only lasts minutes to hours and does not cause lasting damage. It is still important to get immediate medical care. A TIA may be a warning that you are about to have an ischemic stroke. An ischemic stroke happens when blood flow to the brain is suddenly blocked, usually by a blood clot.    Ischemic Stroke         DISCHARGE INSTRUCTIONS:    Call your local emergency number (911 in the ) or have someone else call if:   •You have any of the following signs of a stroke: ?Numbness or drooping on one side of your face       ?Weakness in an arm or leg      ?Confusion or difficulty speaking      ?Dizziness, a severe headache, or vision loss    BE FAST SIGNS OF A STROKE         •You have a seizure.      •You have chest pain or shortness of breath.      •You cough up blood.      Return to the emergency department if:   •Your arm or leg feels warm, tender, and painful. It may look swollen and red.      •You have unusual or heavy bleeding.      •You have a severe headache or feel dizzy.      Call your doctor or neurologist if:   •Your blood pressure or blood sugar level is higher or lower than you were told it should be.      •You have questions or concerns about your condition or care.      Warning signs of a stroke: The words BE FAST can help you remember and recognize warning signs of a stroke:  •B = Balance: Sudden loss of balance      •E = Eyes: Loss of vision in one or both eyes      •F = Face: Face droops on one side      •A = Arms: Arm drops when both arms are raised      •S = Speech: Speech is slurred or sounds different      •T = Time: Time to get help immediately    BE FAST SIGNS OF A STROKE         Medicines: You may need any of the following:   •Antiplatelets, such as aspirin, help prevent blood clots. Take your antiplatelet medicine exactly as directed. These medicines make it more likely for you to bleed or bruise. If you are told to take aspirin, do not take acetaminophen or ibuprofen instead.       •Blood thinners help prevent blood clots. Clots can cause strokes, heart attacks, and death. The following are general safety guidelines to follow while you are taking a blood thinner:?Watch for bleeding and bruising while you take blood thinners. Watch for bleeding from your gums or nose. Watch for blood in your urine and bowel movements. Use a soft washcloth on your skin, and a soft toothbrush to brush your teeth. This can keep your skin and gums from bleeding. If you shave, use an electric shaver. Do not play contact sports.       ?Tell your dentist and other healthcare providers that you take a blood thinner. Wear a bracelet or necklace that says you take this medicine.       ?Do not start or stop any other medicines unless your healthcare provider tells you to. Many medicines cannot be used with blood thinners.      ?Take your blood thinner exactly as prescribed by your healthcare provider. Do not skip does or take less than prescribed. Tell your provider right away if you forget to take your blood thinner, or if you take too much.      ?Warfarin is a blood thinner that you may need to take. The following are things you should be aware of if you take warfarin: ?Foods and medicines can affect the amount of warfarin in your blood. Do not make major changes to your diet while you take warfarin. Warfarin works best when you eat about the same amount of vitamin K every day. Vitamin K is found in green leafy vegetables and certain other foods. Ask for more information about what to eat when you are taking warfarin.      ?You will need to see your healthcare provider for follow-up visits when you are on warfarin. You will need regular blood tests. These tests are used to decide how much medicine you need.         •Other medicines may be needed to treat diabetes, depression, high cholesterol, or blood pressure problems. You may also need medicine to decrease the pressure in your brain, reduce pain, or prevent seizures.      •Take your medicine as directed. Contact your healthcare provider if you think your medicine is not helping or if you have side effects. Tell him of her if you are allergic to any medicine. Keep a list of the medicines, vitamins, and herbs you take. Include the amounts, and when and why you take them. Bring the list or the pill bottles to follow-up visits. Carry your medicine list with you in case of an emergency.      What you can do to prevent another TIA or a stroke:   •Manage health conditions. A condition such as diabetes can increase your risk for a stroke. Control your blood sugar level if you have hyperglycemia or diabetes. Take your prescribed medicines and check your blood sugar level as directed.  How to check your blood sugar           •Check your blood pressure as directed. High blood pressure can increase your risk for a stroke. If you have high blood pressure, follow your healthcare provider’s directions for controlling your blood pressure.   How to take a Blood Pressure           •Do not use nicotine products or illegal drugs. Nicotine and other chemicals in cigarettes and cigars can cause blood vessel damage. Nicotine and illegal drugs both increase your risk for a stroke. Ask your healthcare provider for information if you currently smoke or use drugs and need help to quit. E- cigarettes or smokeless tobacco still contain nicotine. Talk to your healthcare provider before you use these products.      •Talk to your healthcare provider about alcohol. Alcohol can raise your blood pressure. The recommended limit is 2 drinks in a day for men and 1 drink in a day for women. Do not binge drink or save a week's worth of alcohol to drink in 1 or 2 days. Limit weekly amounts as directed by your provider.      •Eat a variety of healthy foods. Healthy foods include whole-grain breads, low-fat dairy products, beans, lean meats, and fish. Eat at least 5 servings of fruits and vegetables each day. Choose foods that are low in fat, cholesterol, salt, and sugar. Eat foods that are high in potassium, such as potatoes and bananas. A dietitian can help you create healthy meal plans.   Healthy Foods           •Maintain a healthy weight. Ask your healthcare provider how much you should weigh. Ask him or her to help you create a weight loss plan if you are overweight. He or she can help you create small goals if you have a lot of weight to lose.      •Exercise as directed. Exercise can lower your blood pressure, cholesterol, weight, and blood sugar levels. Healthcare providers will help you create exercise goals. They can also help you make a plan to reach your goals. For example, you can break exercise into 10 minute periods, 3 times in the day. Find an exercise that you enjoy. This will make it easier for you to reach your exercise goals.  Walking for Exercise           •Manage stress. Stress can raise your blood pressure. Find ways to relax, such as deep breathing or listening to music.      Follow up with your doctor or neurologist in 1 to 2 days: Write down your questions so you remember to ask them during your visits.    ------------------------------------------------------    Urinary Tract Infection, Adult    A urinary tract infection (UTI) is an infection of any part of the urinary tract. The urinary tract includes:  •The kidneys.      •The ureters.      •The bladder.      •The urethra.      These organs make, store, and get rid of pee (urine) in the body.      What are the causes?    This is caused by germs (bacteria) in your genital area. These germs grow and cause swelling (inflammation) of your urinary tract.      What increases the risk?  You are more likely to develop this condition if:  •You have a small, thin tube (catheter) to drain pee.      •You cannot control when you pee or poop (incontinence).    •You are female, and:•You use these methods to prevent pregnancy:  •A medicine that kills sperm (spermicide).      •A device that blocks sperm (diaphragm).        •You have low levels of a female hormone (estrogen).      •You are pregnant.        •You have genes that add to your risk.      •You are sexually active.      •You take antibiotic medicines.     •You have trouble peeing because of:  •A prostate that is bigger than normal, if you are male.      •A blockage in the part of your body that drains pee from the bladder (urethra).      •A kidney stone.       •A nerve condition that affects your bladder (neurogenic bladder).      •Not getting enough to drink.       •Not peeing often enough.      •You have other conditions, such as:  •Diabetes.       •A weak disease-fighting system (immune system).      •Sickle cell disease.       •Gout.       •Injury of the spine.          What are the signs or symptoms?  Symptoms of this condition include:  •Needing to pee right away (urgently).      •Peeing often.      •Peeing small amounts often.      •Pain or burning when peeing.      •Blood in the pee.      •Pee that smells bad or not like normal.      •Trouble peeing.      •Pee that is cloudy.      •Fluid coming from the vagina, if you are female.      •Pain in the belly or lower back.    Other symptoms include:  •Throwing up (vomiting).      •No urge to eat.      •Feeling mixed up (confused).      •Being tired and grouchy (irritable).      •A fever.      •Watery poop (diarrhea).        How is this treated?  This condition may be treated with:  •Antibiotic medicine.       •Other medicines.      •Drinking enough water.        Follow these instructions at home:     Medicines     •Take over-the-counter and prescription medicines only as told by your doctor.      •If you were prescribed an antibiotic medicine, take it as told by your doctor. Do not stop taking it even if you start to feel better.      General instructions   •Make sure you:  •Pee until your bladder is empty.       •Do not hold pee for a long time.      •Empty your bladder after sex.      •Wipe from front to back after pooping if you are a female. Use each tissue one time when you wipe.        •Drink enough fluid to keep your pee pale yellow.      •Keep all follow-up visits as told by your doctor. This is important.        Contact a doctor if:    •You do not get better after 1–2 days.      •Your symptoms go away and then come back.        Get help right away if:    •You have very bad back pain.      •You have very bad pain in your lower belly.      •You have a fever.      •You are sick to your stomach (nauseous).      •You are throwing up.        Summary    •A urinary tract infection (UTI) is an infection of any part of the urinary tract.      •This condition is caused by germs in your genital area.      •There are many risk factors for a UTI. These include having a small, thin tube to drain pee and not being able to control when you pee or poop.      •Treatment includes antibiotic medicines for germs.      •Drink enough fluid to keep your pee pale yellow.

## 2022-03-23 NOTE — ED PROVIDER NOTE - CARE PLAN
1 Principal Discharge DX:	Slurred speech  Secondary Diagnosis:	UTI (urinary tract infection), bacterial   Principal Discharge DX:	Bacterial UTI  Secondary Diagnosis:	Hyponatremia

## 2022-03-23 NOTE — ED PROVIDER NOTE - CLINICAL SUMMARY MEDICAL DECISION MAKING FREE TEXT BOX
Attending Cathie: 83 y/o F w/ PMH of dementia, HTN, hypothyroid, DM, TIA presenting w/ slurred speech. Code stroke on arrival. Seen in green, w/ son. Reporting feeling unwell for the past few days w/ some intermittent slurred speech. Reportedly w/ slurred speech throughout the day today. had gone to bed around 9:30pm last night and did not have slurred speech then. Son endorsing slurred speech at this time has seemed to have resolved. No recent falls or head trauma. Pt denying other complaints at this time. Pt well appearing on exam, no acute distress. Lungs clear. HR regular. Abd nondistended/soft/nontender. Cn 2-12 grossly intact b/l. Speech clear. No facial droop. Str 5/5 x4. No appreciable sensory deficits. Finger to nose normal b/l. Pt w/ slurred speech now resolved. Seen by neuro. Not a TPA candidate. Possible TIA. Will eval for infectious vs. metabolic derangements. Plan for labs, imaging, EKG, meds PRN, neuro recs. Will reassess the need for additional interventions as clinically warranted.

## 2022-03-23 NOTE — ED PROVIDER NOTE - NSFOLLOWUPCLINICS_GEN_ALL_ED_FT
[facility]  Neurology Auto[facility] Encephalitis  [address] [place] [address]  Phone: [phone]  Fax: [phone]

## 2022-03-23 NOTE — ED PROVIDER NOTE - PATIENT PORTAL LINK FT
You can access the FollowMyHealth Patient Portal offered by [facility] by registering at the following website: [email or web address]. By joining [employer]’s FollowMyHealth portal, you will also be able to view your health information using other applications (apps) compatible with our system.

## 2022-03-23 NOTE — ED PROVIDER NOTE - OBJECTIVE STATEMENT
81 yo female hx dementia, CVAs, diabetes, HTN, hypothyroidism presents to the ED accompanied by son for slurred speech. Hx provided by son, states 2 days ago though he noticed slight slurred speech when he saw her but then the next day was fine. Last night at 930PM pt's  noticed she was slurring her words slightly as well. This morning after waking noticed she was still slurring her words and seemed slightly more confused, sxs progressed throughout the day to the point where she was very confused, son came and noted she was slurring her words as well so brought her to ED. LKN ?930pm last night. No known trauma, fall, HA, speech/visual changes, difficulty walking, numbness/tingling.

## 2022-04-09 ENCOUNTER — NON-APPOINTMENT (OUTPATIENT)
Age: 82
End: 2022-04-09

## 2022-04-11 ENCOUNTER — APPOINTMENT (OUTPATIENT)
Dept: NEUROLOGY | Facility: CLINIC | Age: 82
End: 2022-04-11
Payer: MEDICARE

## 2022-04-11 DIAGNOSIS — G45.9 TRANSIENT CEREBRAL ISCHEMIC ATTACK, UNSPECIFIED: ICD-10-CM

## 2022-04-11 PROCEDURE — 99215 OFFICE O/P EST HI 40 MIN: CPT

## 2024-04-04 ENCOUNTER — EMERGENCY (EMERGENCY)
Facility: HOSPITAL | Age: 84
LOS: 1 days | Discharge: HOME | End: 2024-04-04
Attending: STUDENT IN AN ORGANIZED HEALTH CARE EDUCATION/TRAINING PROGRAM
Payer: MEDICARE

## 2024-04-04 VITALS
TEMPERATURE: 97 F | OXYGEN SATURATION: 98 % | WEIGHT: 175.05 LBS | DIASTOLIC BLOOD PRESSURE: 64 MMHG | HEART RATE: 51 BPM | RESPIRATION RATE: 16 BRPM | SYSTOLIC BLOOD PRESSURE: 108 MMHG

## 2024-04-04 VITALS — TEMPERATURE: 97 F

## 2024-04-04 DIAGNOSIS — Z78.9 OTHER SPECIFIED HEALTH STATUS: Chronic | ICD-10-CM

## 2024-04-04 LAB
ALBUMIN SERPL ELPH-MCNC: 3.9 G/DL — SIGNIFICANT CHANGE UP (ref 3.3–5.2)
ALP SERPL-CCNC: 80 U/L — SIGNIFICANT CHANGE UP (ref 40–120)
ALT FLD-CCNC: 17 U/L — SIGNIFICANT CHANGE UP
ANION GAP SERPL CALC-SCNC: 15 MMOL/L — SIGNIFICANT CHANGE UP (ref 5–17)
APPEARANCE UR: ABNORMAL
APTT BLD: 33.6 SEC — SIGNIFICANT CHANGE UP (ref 24.5–35.6)
AST SERPL-CCNC: 22 U/L — SIGNIFICANT CHANGE UP
BACTERIA # UR AUTO: ABNORMAL /HPF
BASE EXCESS BLDV CALC-SCNC: -0.3 MMOL/L — SIGNIFICANT CHANGE UP (ref -2–3)
BASOPHILS # BLD AUTO: 0.03 K/UL — SIGNIFICANT CHANGE UP (ref 0–0.2)
BASOPHILS NFR BLD AUTO: 0.4 % — SIGNIFICANT CHANGE UP (ref 0–2)
BILIRUB SERPL-MCNC: 0.4 MG/DL — SIGNIFICANT CHANGE UP (ref 0.4–2)
BILIRUB UR-MCNC: NEGATIVE — SIGNIFICANT CHANGE UP
BUN SERPL-MCNC: 16.5 MG/DL — SIGNIFICANT CHANGE UP (ref 8–20)
CA-I SERPL-SCNC: 1.24 MMOL/L — SIGNIFICANT CHANGE UP (ref 1.15–1.33)
CALCIUM SERPL-MCNC: 9.6 MG/DL — SIGNIFICANT CHANGE UP (ref 8.4–10.5)
CAST: 1 /LPF — SIGNIFICANT CHANGE UP (ref 0–4)
CHLORIDE BLDV-SCNC: 100 MMOL/L — SIGNIFICANT CHANGE UP (ref 96–108)
CHLORIDE SERPL-SCNC: 97 MMOL/L — SIGNIFICANT CHANGE UP (ref 96–108)
CO2 SERPL-SCNC: 24 MMOL/L — SIGNIFICANT CHANGE UP (ref 22–29)
COLOR SPEC: YELLOW — SIGNIFICANT CHANGE UP
CREAT SERPL-MCNC: 1.32 MG/DL — HIGH (ref 0.5–1.3)
DIFF PNL FLD: NEGATIVE — SIGNIFICANT CHANGE UP
EGFR: 40 ML/MIN/1.73M2 — LOW
EOSINOPHIL # BLD AUTO: 0 K/UL — SIGNIFICANT CHANGE UP (ref 0–0.5)
EOSINOPHIL NFR BLD AUTO: 0 % — SIGNIFICANT CHANGE UP (ref 0–6)
FLUAV AG NPH QL: SIGNIFICANT CHANGE UP
FLUBV AG NPH QL: SIGNIFICANT CHANGE UP
GAS PNL BLDV: 135 MMOL/L — LOW (ref 136–145)
GAS PNL BLDV: SIGNIFICANT CHANGE UP
GLUCOSE BLDV-MCNC: 285 MG/DL — HIGH (ref 70–99)
GLUCOSE SERPL-MCNC: 273 MG/DL — HIGH (ref 70–99)
GLUCOSE UR QL: NEGATIVE MG/DL — SIGNIFICANT CHANGE UP
HCO3 BLDV-SCNC: 26 MMOL/L — SIGNIFICANT CHANGE UP (ref 22–29)
HCT VFR BLD CALC: 36.8 % — SIGNIFICANT CHANGE UP (ref 34.5–45)
HCT VFR BLDA CALC: 37 % — SIGNIFICANT CHANGE UP
HGB BLD CALC-MCNC: 12.3 G/DL — SIGNIFICANT CHANGE UP (ref 11.7–16.1)
HGB BLD-MCNC: 11.8 G/DL — SIGNIFICANT CHANGE UP (ref 11.5–15.5)
IMM GRANULOCYTES NFR BLD AUTO: 0.2 % — SIGNIFICANT CHANGE UP (ref 0–0.9)
INR BLD: 1.03 RATIO — SIGNIFICANT CHANGE UP (ref 0.85–1.18)
KETONES UR-MCNC: NEGATIVE MG/DL — SIGNIFICANT CHANGE UP
LACTATE BLDV-MCNC: 2.6 MMOL/L — HIGH (ref 0.5–2)
LACTATE SERPL-SCNC: 1.8 MMOL/L — SIGNIFICANT CHANGE UP (ref 0.5–2)
LEUKOCYTE ESTERASE UR-ACNC: ABNORMAL
LYMPHOCYTES # BLD AUTO: 2.34 K/UL — SIGNIFICANT CHANGE UP (ref 1–3.3)
LYMPHOCYTES # BLD AUTO: 27.6 % — SIGNIFICANT CHANGE UP (ref 13–44)
MCHC RBC-ENTMCNC: 27.3 PG — SIGNIFICANT CHANGE UP (ref 27–34)
MCHC RBC-ENTMCNC: 32.1 GM/DL — SIGNIFICANT CHANGE UP (ref 32–36)
MCV RBC AUTO: 85.2 FL — SIGNIFICANT CHANGE UP (ref 80–100)
MONOCYTES # BLD AUTO: 0.56 K/UL — SIGNIFICANT CHANGE UP (ref 0–0.9)
MONOCYTES NFR BLD AUTO: 6.6 % — SIGNIFICANT CHANGE UP (ref 2–14)
NEUTROPHILS # BLD AUTO: 5.53 K/UL — SIGNIFICANT CHANGE UP (ref 1.8–7.4)
NEUTROPHILS NFR BLD AUTO: 65.2 % — SIGNIFICANT CHANGE UP (ref 43–77)
NITRITE UR-MCNC: POSITIVE
NT-PROBNP SERPL-SCNC: 797 PG/ML — HIGH (ref 0–300)
PCO2 BLDV: 53 MMHG — HIGH (ref 39–42)
PH BLDV: 7.3 — LOW (ref 7.32–7.43)
PH UR: 5.5 — SIGNIFICANT CHANGE UP (ref 5–8)
PLATELET # BLD AUTO: 298 K/UL — SIGNIFICANT CHANGE UP (ref 150–400)
PO2 BLDV: 45 MMHG — SIGNIFICANT CHANGE UP (ref 25–45)
POTASSIUM BLDV-SCNC: 3.9 MMOL/L — SIGNIFICANT CHANGE UP (ref 3.5–5.1)
POTASSIUM SERPL-MCNC: 3.6 MMOL/L — SIGNIFICANT CHANGE UP (ref 3.5–5.3)
POTASSIUM SERPL-SCNC: 3.6 MMOL/L — SIGNIFICANT CHANGE UP (ref 3.5–5.3)
PROT SERPL-MCNC: 7.4 G/DL — SIGNIFICANT CHANGE UP (ref 6.6–8.7)
PROT UR-MCNC: NEGATIVE MG/DL — SIGNIFICANT CHANGE UP
PROTHROM AB SERPL-ACNC: 11.4 SEC — SIGNIFICANT CHANGE UP (ref 9.5–13)
RBC # BLD: 4.32 M/UL — SIGNIFICANT CHANGE UP (ref 3.8–5.2)
RBC # FLD: 14.2 % — SIGNIFICANT CHANGE UP (ref 10.3–14.5)
RBC CASTS # UR COMP ASSIST: 4 /HPF — SIGNIFICANT CHANGE UP (ref 0–4)
RSV RNA NPH QL NAA+NON-PROBE: SIGNIFICANT CHANGE UP
SAO2 % BLDV: 67.2 % — SIGNIFICANT CHANGE UP
SARS-COV-2 RNA SPEC QL NAA+PROBE: SIGNIFICANT CHANGE UP
SODIUM SERPL-SCNC: 136 MMOL/L — SIGNIFICANT CHANGE UP (ref 135–145)
SP GR SPEC: 1.01 — SIGNIFICANT CHANGE UP (ref 1–1.03)
SQUAMOUS # UR AUTO: 0 /HPF — SIGNIFICANT CHANGE UP (ref 0–5)
TROPONIN T, HIGH SENSITIVITY RESULT: 19 NG/L — SIGNIFICANT CHANGE UP (ref 0–51)
TROPONIN T, HIGH SENSITIVITY RESULT: 20 NG/L — SIGNIFICANT CHANGE UP (ref 0–51)
UROBILINOGEN FLD QL: 1 MG/DL — SIGNIFICANT CHANGE UP (ref 0.2–1)
WBC # BLD: 8.48 K/UL — SIGNIFICANT CHANGE UP (ref 3.8–10.5)
WBC # FLD AUTO: 8.48 K/UL — SIGNIFICANT CHANGE UP (ref 3.8–10.5)
WBC UR QL: 4 /HPF — SIGNIFICANT CHANGE UP (ref 0–5)

## 2024-04-04 PROCEDURE — 71045 X-RAY EXAM CHEST 1 VIEW: CPT

## 2024-04-04 PROCEDURE — 82947 ASSAY GLUCOSE BLOOD QUANT: CPT

## 2024-04-04 PROCEDURE — 93010 ELECTROCARDIOGRAM REPORT: CPT

## 2024-04-04 PROCEDURE — 71045 X-RAY EXAM CHEST 1 VIEW: CPT | Mod: 26

## 2024-04-04 PROCEDURE — 82330 ASSAY OF CALCIUM: CPT

## 2024-04-04 PROCEDURE — 85610 PROTHROMBIN TIME: CPT

## 2024-04-04 PROCEDURE — 83880 ASSAY OF NATRIURETIC PEPTIDE: CPT

## 2024-04-04 PROCEDURE — 83605 ASSAY OF LACTIC ACID: CPT

## 2024-04-04 PROCEDURE — 36415 COLL VENOUS BLD VENIPUNCTURE: CPT

## 2024-04-04 PROCEDURE — 82435 ASSAY OF BLOOD CHLORIDE: CPT

## 2024-04-04 PROCEDURE — 99284 EMERGENCY DEPT VISIT MOD MDM: CPT | Mod: GC

## 2024-04-04 PROCEDURE — 87186 SC STD MICRODIL/AGAR DIL: CPT

## 2024-04-04 PROCEDURE — 81001 URINALYSIS AUTO W/SCOPE: CPT

## 2024-04-04 PROCEDURE — 87077 CULTURE AEROBIC IDENTIFY: CPT

## 2024-04-04 PROCEDURE — 85025 COMPLETE CBC W/AUTO DIFF WBC: CPT

## 2024-04-04 PROCEDURE — 87637 SARSCOV2&INF A&B&RSV AMP PRB: CPT

## 2024-04-04 PROCEDURE — 87040 BLOOD CULTURE FOR BACTERIA: CPT

## 2024-04-04 PROCEDURE — 96374 THER/PROPH/DIAG INJ IV PUSH: CPT

## 2024-04-04 PROCEDURE — 85018 HEMOGLOBIN: CPT

## 2024-04-04 PROCEDURE — 84132 ASSAY OF SERUM POTASSIUM: CPT

## 2024-04-04 PROCEDURE — 84295 ASSAY OF SERUM SODIUM: CPT

## 2024-04-04 PROCEDURE — 82803 BLOOD GASES ANY COMBINATION: CPT

## 2024-04-04 PROCEDURE — 93005 ELECTROCARDIOGRAM TRACING: CPT

## 2024-04-04 PROCEDURE — 85730 THROMBOPLASTIN TIME PARTIAL: CPT

## 2024-04-04 PROCEDURE — 85014 HEMATOCRIT: CPT

## 2024-04-04 PROCEDURE — 84484 ASSAY OF TROPONIN QUANT: CPT

## 2024-04-04 PROCEDURE — 80053 COMPREHEN METABOLIC PANEL: CPT

## 2024-04-04 PROCEDURE — 99285 EMERGENCY DEPT VISIT HI MDM: CPT | Mod: 25

## 2024-04-04 PROCEDURE — 87086 URINE CULTURE/COLONY COUNT: CPT

## 2024-04-04 RX ORDER — METFORMIN HYDROCHLORIDE 850 MG/1
1 TABLET ORAL
Qty: 0 | Refills: 0 | DISCHARGE

## 2024-04-04 RX ORDER — LOSARTAN/HYDROCHLOROTHIAZIDE 100MG-25MG
1 TABLET ORAL
Qty: 0 | Refills: 0 | DISCHARGE

## 2024-04-04 RX ORDER — RABEPRAZOLE 20 MG/1
1 TABLET, DELAYED RELEASE ORAL
Qty: 0 | Refills: 0 | DISCHARGE

## 2024-04-04 RX ORDER — ROSUVASTATIN CALCIUM 5 MG/1
1 TABLET ORAL
Qty: 0 | Refills: 0 | DISCHARGE

## 2024-04-04 RX ORDER — CHLORTHALIDONE 50 MG
1 TABLET ORAL
Refills: 0 | DISCHARGE

## 2024-04-04 RX ORDER — METOPROLOL TARTRATE 50 MG
1 TABLET ORAL
Qty: 0 | Refills: 0 | DISCHARGE

## 2024-04-04 RX ORDER — MEMANTINE HYDROCHLORIDE 10 MG/1
1 TABLET ORAL
Refills: 0 | DISCHARGE

## 2024-04-04 RX ORDER — OMEPRAZOLE 10 MG/1
1 CAPSULE, DELAYED RELEASE ORAL
Qty: 0 | Refills: 0 | DISCHARGE

## 2024-04-04 RX ORDER — ACETAMINOPHEN 500 MG
1000 TABLET ORAL ONCE
Refills: 0 | Status: COMPLETED | OUTPATIENT
Start: 2024-04-04 | End: 2024-04-04

## 2024-04-04 RX ORDER — ASPIRIN AND DIPYRIDAMOLE 25; 200 MG/1; MG/1
1 CAPSULE, EXTENDED RELEASE ORAL
Qty: 0 | Refills: 0 | DISCHARGE

## 2024-04-04 RX ORDER — LEVOTHYROXINE SODIUM 125 MCG
1 TABLET ORAL
Qty: 0 | Refills: 0 | DISCHARGE

## 2024-04-04 RX ORDER — SODIUM CHLORIDE 9 MG/ML
1000 INJECTION, SOLUTION INTRAVENOUS ONCE
Refills: 0 | Status: COMPLETED | OUTPATIENT
Start: 2024-04-04 | End: 2024-04-04

## 2024-04-04 RX ORDER — CEPHALEXIN 500 MG
1 CAPSULE ORAL
Qty: 15 | Refills: 0
Start: 2024-04-04 | End: 2024-04-08

## 2024-04-04 RX ORDER — AMLODIPINE BESYLATE 2.5 MG/1
1 TABLET ORAL
Refills: 0 | DISCHARGE

## 2024-04-04 RX ORDER — CEFPODOXIME PROXETIL 100 MG
1 TABLET ORAL
Refills: 0
Start: 2024-04-04

## 2024-04-04 RX ORDER — HYOSCYAMINE SULFATE 0.13 MG
0 TABLET ORAL
Qty: 0 | Refills: 0 | DISCHARGE

## 2024-04-04 RX ORDER — CEFPODOXIME PROXETIL 100 MG
100 TABLET ORAL ONCE
Refills: 0 | Status: DISCONTINUED | OUTPATIENT
Start: 2024-04-04 | End: 2024-04-04

## 2024-04-04 RX ORDER — CEFTRIAXONE 500 MG/1
1000 INJECTION, POWDER, FOR SOLUTION INTRAMUSCULAR; INTRAVENOUS ONCE
Refills: 0 | Status: COMPLETED | OUTPATIENT
Start: 2024-04-04 | End: 2024-04-04

## 2024-04-04 RX ORDER — DONEPEZIL HYDROCHLORIDE 10 MG/1
1 TABLET, FILM COATED ORAL
Qty: 0 | Refills: 0 | DISCHARGE

## 2024-04-04 RX ORDER — MULTIVIT-MIN/FERROUS GLUCONATE 9 MG/15 ML
1 LIQUID (ML) ORAL
Qty: 0 | Refills: 0 | DISCHARGE

## 2024-04-04 RX ORDER — DEXLANSOPRAZOLE 30 MG/1
1 CAPSULE, DELAYED RELEASE ORAL
Qty: 0 | Refills: 0 | DISCHARGE

## 2024-04-04 RX ADMIN — SODIUM CHLORIDE 1000 MILLILITER(S): 9 INJECTION, SOLUTION INTRAVENOUS at 18:25

## 2024-04-04 RX ADMIN — CEFTRIAXONE 1000 MILLIGRAM(S): 500 INJECTION, POWDER, FOR SOLUTION INTRAMUSCULAR; INTRAVENOUS at 20:34

## 2024-04-04 NOTE — ED PROVIDER NOTE - CLINICAL SUMMARY MEDICAL DECISION MAKING FREE TEXT BOX
84 y.o. F PMH advanced dementia, CVA, DM, hypothyroidism, HTN, HLD presents for AMS. Per family at bedside, patient was awake but unresponsive this morning, not her baseline mentation. After a few minutes patient was mumbling, struggling to speak. Family called EMS, BP on field was hypotensive - SBP 60s. Per family, pt is now at baseline mental status in ED. No fevers, sweats, chills. Tolerating PO intake, no changes to appetite. Endorsed chest pain briefly this AM. No other complaints. In ED, bradycardic and hypothermic - rectal 96.1F. On exam, lungs CTAB, abdomen soft ND NT. Pt placed in gown and full skin examination done - no signs of infection. AOx1, confused (baseline per family)    AMS 2/2 UTI vs URI vs bacteremia vs dementia progression  r/o ACS  Will obtain labs, coags, vbg, flu w/ covid swab, troponin, BNP, UA, urine culture, blood cx x 2, EKG, CXR. IVF bolus, IV tylenol. Family would like to defer CTH noncon at this time, pending lab results. Cardiac monitor and pulse ox.

## 2024-04-04 NOTE — ED PROVIDER NOTE - NSICDXPASTMEDICALHX_GEN_ALL_CORE_FT
PAST MEDICAL HISTORY:  CVA (cerebrovascular accident)     Dementia     GERD (gastroesophageal reflux disease)     Hypertension     Hypothyroidism     Migraines

## 2024-04-04 NOTE — ED PROVIDER NOTE - OUTSIDE ED RECORD SUMMARY
2017 Echo shows normal EF and stress test. Cardiologist Dr. Sanjeev Carballo. Last seen a few years ago.

## 2024-04-04 NOTE — ED PROVIDER NOTE - PHYSICAL EXAMINATION
Gen: NAD, AOx1, confused (baseline per family)  Head: NCAT  HEENT: EOMI, oral mucosa moist, normal conjunctiva, neck supple  Lung: CTAB, no respiratory distress  CV: +bradycardic, regular rhythm, no murmur, Normal perfusion  Abd: soft, NT, ND. No guarding, no rigidity  MSK: No edema, no visible deformities  Neuro: No focal neurologic deficits  Skin: Pt placed in gown and full skin examination done - No rash, erythema, wounds, ecchymosis, drainage, bleeding

## 2024-04-04 NOTE — ED PROVIDER NOTE - NSFOLLOWUPINSTRUCTIONS_ED_ALL_ED_FT
Urinary Tract Infection in Older Adults    WHAT YOU NEED TO KNOW:    What is a urinary tract infection (UTI)? A UTI is caused by bacteria that get inside your urinary tract. Your urinary tract includes your kidneys, ureters, bladder, and urethra. A UTI is more common in your lower urinary tract, which includes your bladder and urethra.  Kidney, Ureters, Bladder    What increases my risk for a UTI?    A UTI within the last 3 months    Menopause in women    Enlarged prostate in men    Medicines that affect urination    Urinary incontinence (you cannot control your bladder)    Sexual intercourse    Medical conditions, such as diabetes or obesity    Urinary tract problems, such as a narrowing, kidney stones, or inability to empty your bladder completely  What are the signs and symptoms of a UTI?    Fever and chills    Pain or burning when you urinate    Urine that smells bad or looks cloudy, or blood in your urine    Urinating more often or waking from sleep to urinate    Sudden, strong need to urinate    Pain or pressure in your lower abdomen    Leaking urine    Confusion or agitation    Fatigue, shakiness, and weakness  How is a UTI diagnosed? Your healthcare provider will ask about your symptoms. He or she may also examine you. You may need any of the following:    A urinalysis will give information about your urinary tract and overall health.    A urine culture may show the type of germ causing the infection. You may need this test again if you continue to have signs and symptoms after a UTI is treated.  How is a UTI treated? Medicines treat the bacterial infection or decrease pain and burning when you urinate. You may also need medicines to decrease the urge to urinate often. If you have UTIs often (called recurrent UTIs), you may be given antibiotics to take regularly. You will be given directions for when and how to use antibiotics. The goal is to prevent UTIs but not cause antibiotic resistance by using antibiotics too often.    How can I manage my symptoms?    Drink liquids as directed. Liquids can help flush bacteria from your urinary tract. Ask how much liquid to drink each day and which liquids are best for you. You may need to drink more liquids than usual to help flush out the bacteria. Do not drink alcohol, caffeine, and citrus juices. These can irritate your bladder and increase your symptoms.    Apply heat on your abdomen for 20 to 30 minutes every 2 hours for as many days as directed. Heat helps decrease discomfort and pressure in your bladder.  How can I help prevent a UTI?    Urinate when you feel the urge. Do not hold your urine. Bacteria can grow if urine stays in the bladder too long. It may be helpful to urinate at least every 3 to 4 hours.    Urinate after you have sex. This will help flush away bacteria that can enter your urinary tract during sex.    Wear cotton underwear and clothes that are loose. Tight pants and nylon underwear can trap moisture and cause bacteria to grow.    Drink cranberry juice or take cranberry supplements. These may help prevent UTIs. Your healthcare provider can recommend the right juice or supplement for you.    Women should wipe front to back after urinating or having a bowel movement. This may prevent germs from getting into the urinary tract. Do not douche or use feminine deodorants. These can change the chemical balance in your vagina. You may also be given vaginal estrogen medicine. This medicine helps prevent recurrent UTIs in women who have gone through menopause or are in joselyn-menopause.  When should I seek immediate care?    You become confused or agitated.    You fall down.    You are urinating very little or not at all.    You are vomiting.    You have a high fever with shaking chills.    You have side or back pain that gets worse.  When should I call my doctor?    You have a fever.    You are a woman and you have increased white or yellow discharge from your vagina.    You do not feel better after 2 days of taking antibiotics.    You have questions or concerns about your condition or care.  CARE AGREEMENT:    You have the right to help plan your care. Learn about your health condition and how it may be treated. Discuss treatment options with your healthcare providers to decide what care you want to receive. You always have the right to refuse treatment.

## 2024-04-04 NOTE — ED ADULT NURSE NOTE - OBJECTIVE STATEMENT
Pt brought in by family for an episode of unresponsiveness then started mumbling. Pt with hx of dementia and unable to communicate needs.  Family at bedside.

## 2024-04-04 NOTE — ED PROVIDER NOTE - UNABLE TO OBTAIN
ROS obtained from family in regards to what pt complained of at home, pt has advanced dementia Dementia

## 2024-04-04 NOTE — ED PROVIDER NOTE - ATTENDING CONTRIBUTION TO CARE
I personally saw the patient with the resident, and completed the key components of the history and physical exam. I then discussed the management plan with the resident. Presents with transient altered mental status from baseline along with family.  No acute distress on exam, lungs clear bilateral.  Will obtain CT imaging of head, laboratory studies and UA to rule out metabolic encephalopathy.  Workup pending signed out to overnight attending.

## 2024-04-04 NOTE — ED PROVIDER NOTE - BIRTH SEX
Spoke to patient and discussed changes in medications. Pt verbalized understanding and agreed to plan. Pt was unable to  glipizide at pharmacy [place]. Pended Rx for glipizide 5mg to [facility] in [facility]. Female

## 2024-04-04 NOTE — ED PROVIDER NOTE - PATIENT PORTAL LINK FT
You can access the FollowMyHealth Patient Portal offered by [facility] by registering at the following website: [email or web address]. By joining [employer]’s FollowMyHealth portal, you will also be able to view your health information using other applications (apps) compatible with our system.

## 2024-04-04 NOTE — ED ADULT NURSE NOTE - CAS DISCH BELONGINGS RETURNED
Continue taking your prednisone daily as currently prescribed, in the morning.  In the evening you can take a meloxicam 15 mg tablet.  Typically you should only be taking meloxicam once a day.  In addition use Percocet as needed for severe pain.  I would recommend that you follow-up with Dr. Jones (neurosurgeon).    It was a pleasure taking care of you at [facility] Emergency Department today.  We know that when you come to [facility], you are entrusting us with your health, comfort, and safety.  Our physicians and nurses honor that trust, and we truly appreciate the opportunity to care for you and your loved ones.      We also value your feedback.  If you receive a survey about your Emergency Department experience today, please fill it out.  We care about our patients' feedback, and we listen to what you have to say.  Thank you!    
Abdomen soft, non-tender, no guarding.
Yes

## 2024-04-04 NOTE — ED PROVIDER NOTE - PROGRESS NOTE DETAILS
Jaya: Pt received in signout from Dr. Cruz. Pt act baseline mental status per family. Workup showing UTI; no other emergent findings. Family comfortable taking her home. Medically stable for discharge.

## 2024-04-04 NOTE — ED PROVIDER NOTE - OBJECTIVE STATEMENT
84 y.o. F PMH advanced dementia, CVA, DM, hypothyroidism, HTN, HLD presents for AMS. Per family at bedside, patient was awake but unresponsive this morning, not her baseline mentation. After a few minutes patient was mumbling, struggling to speak. Family called EMS, BP on field was hypotensive - SBP 60s. Has taken all her medications including losartan this AM. Ambulates with difficulty with a walker and assistance at baseline. No fevers, sweats, chills. Tolerating PO intake, no changes to appetite. Endorsed chest pain briefly this AM. No SOB, vision changes, cough, rhinorrhea, headache, abdominal pain, nausea/vomiting, diarrhea, hematuria, dysuria, bloody stools. 84 y.o. F PMH advanced dementia, CVA, DM, hypothyroidism, HTN, HLD presents for AMS. Per family at bedside, patient was awake but unresponsive this morning, not her baseline mentation. After a few minutes patient was mumbling, struggling to speak. Family called EMS, BP on field was hypotensive - SBP 60s. Has taken all her medications including losartan this AM. Ambulates with difficulty with a walker and assistance at baseline. Per family, pt is now at baseline mental status in ED. No fevers, sweats, chills. Tolerating PO intake, no changes to appetite. Endorsed chest pain briefly this AM. No SOB, vision changes, cough, rhinorrhea, headache, abdominal pain, nausea/vomiting, diarrhea, hematuria, dysuria, bloody stools. 84 y.o. F PMH advanced dementia, CVA, DM, hypothyroidism, HTN, HLD presents for AMS. Per family at bedside, patient was awake but unresponsive this morning, not her baseline mentation. After a few minutes patient was mumbling, struggling to speak. Family called EMS, BP on field was hypotensive - SBP 60s. Has taken all her medications including losartan this AM. Ambulates with difficulty with a walker and assistance at baseline. Per family, pt is now at baseline mental status in ED. No fevers, sweats, chills. Tolerating PO intake, no changes to appetite. Endorsed chest pain briefly this AM. No SOB, vision changes, cough, rhinorrhea, headache, abdominal pain, nausea/vomiting, diarrhea, hematuria, dysuria, bloody stools.    2017 Echo shows normal EF and stress test. Cardiologist Dr. Sanjeev Carballo. Last seen a few years ago.

## 2024-04-04 NOTE — ED ADULT NURSE NOTE - NSFALLRISKINTERV_ED_ALL_ED
Assistance OOB with selected safe patient handling equipment if applicable/Assistance with ambulation/Communicate fall risk and risk factors to all staff, patient, and family/Monitor gait and stability/Monitor for mental status changes and reorient to person, place, and time, as needed/Move patient closer to nursing station/within visual sight of ED staff/Provide patient with walking aids/Provide visual cue: yellow wristband, yellow gown, etc/Reinforce activity limits and safety measures with patient and family/Toileting schedule using arm’s reach rule for commode and bathroom/Use of alarms - bed, stretcher, chair and/or video monitoring/Call bell, personal items and telephone in reach/Instruct patient to call for assistance before getting out of bed/chair/stretcher/Non-slip footwear applied when patient is off stretcher/[place] to call system/Physically safe environment - no spills, clutter or unnecessary equipment/Purposeful Proactive Rounding/Room/bathroom lighting operational, light cord in reach

## 2024-04-10 LAB
CULTURE RESULTS: SIGNIFICANT CHANGE UP
CULTURE RESULTS: SIGNIFICANT CHANGE UP
SPECIMEN SOURCE: SIGNIFICANT CHANGE UP
SPECIMEN SOURCE: SIGNIFICANT CHANGE UP

## 2025-01-21 NOTE — ED ADULT NURSE NOTE - PATIENT IS UNABLE TO BE SCREENED DUE TO:
Patient candidate for PCU for  management of symptoms and disposition planning which will be guide by clinical course.     Recommendation:  - Comfort measures: vitals q daily, no blood draws, no rapids, no MEWs, no code strokes  -  IV Ativan 0.5 mg q4h PRN for agitation   - IV Glycopyrrolate 0.4 mg q6hrs PRN for excessive oral secretions Surrogate:  Nick  Code status: DNR/I    Discussed focusing on symptom management and de-escalating burdensome interventions (blood draws, imaging, investigations, frequent vital signs). Patient and  agreed to discontinue non-palliative medications to prioritize comfort. Offered Palliative Care Unit (PCU) for symptom management Discussed plan with Hematology/Oncology, who recommended continuing PROVERA and Amicar infusions until PCU transfer.    Please see complete GOC note Acuity of illness